# Patient Record
Sex: FEMALE | Race: BLACK OR AFRICAN AMERICAN | NOT HISPANIC OR LATINO | Employment: FULL TIME | URBAN - METROPOLITAN AREA
[De-identification: names, ages, dates, MRNs, and addresses within clinical notes are randomized per-mention and may not be internally consistent; named-entity substitution may affect disease eponyms.]

---

## 2018-05-01 PROCEDURE — G0124 SCREEN C/V THIN LAYER BY MD: HCPCS | Performed by: PATHOLOGY

## 2018-05-01 PROCEDURE — G0145 SCR C/V CYTO,THINLAYER,RESCR: HCPCS | Performed by: PATHOLOGY

## 2018-05-01 PROCEDURE — 87624 HPV HI-RISK TYP POOLED RSLT: CPT | Performed by: FAMILY MEDICINE

## 2018-05-02 ENCOUNTER — LAB REQUISITION (OUTPATIENT)
Dept: LAB | Facility: HOSPITAL | Age: 48
End: 2018-05-02
Payer: COMMERCIAL

## 2018-05-02 DIAGNOSIS — Z01.419 ENCOUNTER FOR GYNECOLOGICAL EXAMINATION WITHOUT ABNORMAL FINDING: ICD-10-CM

## 2018-05-02 DIAGNOSIS — Z11.51 ENCOUNTER FOR SCREENING FOR HUMAN PAPILLOMAVIRUS (HPV): ICD-10-CM

## 2018-05-03 LAB — HPV RRNA GENITAL QL NAA+PROBE: NORMAL

## 2018-05-08 LAB
LAB AP GYN PRIMARY INTERPRETATION: NORMAL
LAB AP LMP: NORMAL
Lab: NORMAL
PATH INTERP SPEC-IMP: NORMAL

## 2018-06-04 LAB
ABSOL LYMPHOCYTES (HISTORICAL): 2.4 K/UL (ref 0.5–4)
ALBUMIN SERPL BCP-MCNC: 3.6 G/DL (ref 3–5.2)
ALP SERPL-CCNC: 68 U/L (ref 43–122)
ALT SERPL W P-5'-P-CCNC: 42 U/L (ref 9–52)
ANION GAP SERPL CALCULATED.3IONS-SCNC: 8 MMOL/L (ref 5–14)
AST SERPL W P-5'-P-CCNC: 52 U/L (ref 14–36)
BASOPHILS # BLD AUTO: 0.1 K/UL (ref 0–0.1)
BASOPHILS # BLD AUTO: 1 % (ref 0–1)
BILIRUB SERPL-MCNC: 0.7 MG/DL
BUN SERPL-MCNC: 8 MG/DL (ref 5–25)
CALCIUM SERPL-MCNC: 9.2 MG/DL (ref 8.4–10.2)
CHLORIDE SERPL-SCNC: 102 MEQ/L (ref 97–108)
CHOLEST SERPL-MCNC: 166 MG/DL
CHOLEST/HDLC SERPL: 3.6 {RATIO}
CO2 SERPL-SCNC: 29 MMOL/L (ref 22–30)
CREATINE, SERUM (HISTORICAL): 0.71 MG/DL (ref 0.6–1.2)
DEPRECATED RDW RBC AUTO: 15.8 %
EGFR (HISTORICAL): >60 ML/MIN/1.73 M2
EOSINOPHIL # BLD AUTO: 0.1 K/UL (ref 0–0.4)
EOSINOPHIL NFR BLD AUTO: 1 % (ref 0–6)
EST. AVERAGE GLUCOSE BLD GHB EST-MCNC: 111 MG/DL
GLUCOSE SERPL-MCNC: 98 MG/DL (ref 70–99)
HBA1C MFR BLD HPLC: 5.5 %
HCT VFR BLD AUTO: 37.2 % (ref 36–46)
HDLC SERPL-MCNC: 46 MG/DL
HGB BLD-MCNC: 12.5 G/DL (ref 12–16)
LDL/HDL RATIO (HISTORICAL): 1.7
LDLC SERPL CALC-MCNC: 78 MG/DL
LYMPHOCYTES NFR BLD AUTO: 27 % (ref 25–45)
MCH RBC QN AUTO: 33.4 PG (ref 26–34)
MCHC RBC AUTO-ENTMCNC: 33.7 % (ref 31–36)
MCV RBC AUTO: 99 FL (ref 80–100)
MONOCYTES # BLD AUTO: 0.7 K/UL (ref 0.2–0.9)
MONOCYTES NFR BLD AUTO: 8 % (ref 1–10)
NEUTROPHILS ABS COUNT (HISTORICAL): 5.6 K/UL (ref 1.8–7.8)
NEUTS SEG NFR BLD AUTO: 63 % (ref 45–65)
PLATELET # BLD AUTO: 266 K/MCL (ref 150–450)
POTASSIUM SERPL-SCNC: 3.8 MEQ/L (ref 3.6–5)
RBC # BLD AUTO: 3.75 M/MCL (ref 4–5.2)
SODIUM SERPL-SCNC: 139 MEQ/L (ref 137–147)
T4 FREE SERPL-MCNC: 0.99 NG/DL (ref 0.78–2.19)
TOTAL PROTEIN (HISTORICAL): 6.9 G/DL (ref 5.9–8.4)
TRIGL SERPL-MCNC: 208 MG/DL
TSH SERPL DL<=0.05 MIU/L-ACNC: 0.45 UIU/ML (ref 0.47–4.68)
VLDLC SERPL CALC-MCNC: 42 MG/DL (ref 0–40)
WBC # BLD AUTO: 8.8 K/MCL (ref 4.5–11)

## 2018-11-28 ENCOUNTER — HOSPITAL ENCOUNTER (EMERGENCY)
Facility: HOSPITAL | Age: 48
Discharge: HOME | End: 2018-11-28
Attending: EMERGENCY MEDICINE | Admitting: EMERGENCY MEDICINE
Payer: COMMERCIAL

## 2018-11-28 VITALS
HEART RATE: 97 BPM | RESPIRATION RATE: 16 BRPM | TEMPERATURE: 96.9 F | DIASTOLIC BLOOD PRESSURE: 89 MMHG | OXYGEN SATURATION: 99 % | SYSTOLIC BLOOD PRESSURE: 140 MMHG | WEIGHT: 215.39 LBS

## 2018-11-28 DIAGNOSIS — F10.20 ALCOHOL DEPENDENCE (HCC): Primary | ICD-10-CM

## 2018-11-28 PROCEDURE — 99282 EMERGENCY DEPT VISIT SF MDM: CPT

## 2018-11-28 NOTE — ED PROVIDER NOTES
History  Chief Complaint   Patient presents with    Addiction Problem     pt states that she has been drinking daily for about 9 months  pt denies SI/HI/AH/VH  pt stating that she wants rehab     44-year-old female presents for referral for alcohol detox  She states that she drinks a minimum of a pt of hard liquor daily and has been for several months  She states that whenever she goes for prolonged periods of time without drinking she will get the shakes and feel dizzy but denies ever having withdrawal seizures  She states that approximately 20 years ago she had gone through detox and feels that this is what she needs again  She admits to anxiety depression related to her drinking but denies any thoughts of harming herself  She denies homicidal ideation but states that at times she feels like she is hallucinating  She reports that her last drink was around 11 o'clock this morning  Addiction Problem   Similar prior episodes: yes    Severity:  Severe  Onset quality:  Gradual  Duration: Months  Timing:  Constant  Progression:  Waxing and waning  Chronicity:  Recurrent  Suspected agents:  Alcohol  Associated symptoms: no abdominal pain, no agitation, no blackouts, no bladder incontinence, no bowel incontinence, no confusion, no hallucinations, no headaches, no loss of consciousness, no nausea, no palpitations, no seizures, no shortness of breath, no somnolence, no suicidal ideation, no violence, no vomiting and no weakness        None       Past Medical History:   Diagnosis Date    Cancer ([facility] [address] )     cervical cancer    Hypertension        Past Surgical History:   Procedure Laterality Date    CERVICAL BIOPSY  W/ LOOP ELECTRODE EXCISION      TUBAL LIGATION         History reviewed  No pertinent family history  I have reviewed and agree with the history as documented      Social History   Substance Use Topics    Smoking status: Current Some Day Smoker    Smokeless tobacco: Never Used    Alcohol use Yes      Comment: daily        Review of Systems   Constitutional: Negative for appetite change, chills, fatigue and fever  HENT: Negative for postnasal drip, sinus pain and trouble swallowing  Eyes: Negative for redness and itching  Respiratory: Negative for chest tightness, shortness of breath and wheezing  Cardiovascular: Negative for chest pain, palpitations and leg swelling  Gastrointestinal: Negative for abdominal pain, bowel incontinence, constipation, diarrhea, nausea and vomiting  Endocrine: Negative  Genitourinary: Negative for bladder incontinence, difficulty urinating and dysuria  Musculoskeletal: Negative for back pain and myalgias  Skin: Negative for rash  Allergic/Immunologic: Negative  Neurological: Positive for dizziness (Not currently)  Negative for seizures, loss of consciousness, weakness, numbness and headaches  Hematological: Negative  Psychiatric/Behavioral: Negative  Negative for agitation, confusion, hallucinations and suicidal ideas  Physical Exam  Physical Exam   Constitutional: She is oriented to person, place, and time  She appears well-developed and well-nourished  HENT:   Head: Normocephalic and atraumatic  Nose: Nose normal    Mouth/Throat: Oropharynx is clear and moist    Eyes: Pupils are equal, round, and reactive to light  Conjunctivae and EOM are normal    Neck: Normal range of motion  Neck supple  Cardiovascular: Normal rate, regular rhythm and normal heart sounds  Pulmonary/Chest: Effort normal and breath sounds normal  No respiratory distress  She has no wheezes  Abdominal: Soft  Bowel sounds are normal  There is no tenderness  There is no guarding  Musculoskeletal: She exhibits no edema, tenderness or deformity  Neurological: She is alert and oriented to person, place, and time  Skin: Skin is warm and dry  Capillary refill takes less than 2 seconds  No rash noted  Psychiatric: She has a normal mood and affect   Her behavior is normal    Nursing note and vitals reviewed  Vital Signs  ED Triage Vitals [11/28/18 1752]   Temperature Pulse Respirations Blood Pressure SpO2   (!) 96 9 °F (36 1 °C) (!) 107 14 126/87 98 %      Temp Source Heart Rate Source Patient Position - Orthostatic VS BP Location FiO2 (%)   Tympanic Monitor Sitting Left arm --      Pain Score       --           Vitals:    11/28/18 1752   BP: 126/87   Pulse: (!) 107   Patient Position - Orthostatic VS: Sitting       Visual Acuity      ED Medications  Medications - No data to display    Diagnostic Studies  Results Reviewed     None                 No orders to display              Procedures  Procedures       Phone Contacts  ED Phone Contact    ED Course  ED Course as of Nov 28 1954   Wed Nov 28, 2018   1941 Prior to evaluation for alcohol detox, the patient eloped from the department  At the time of her elopement, she was not obviously under the influence his of alcohol  She was not suicidal or homicidal and there were no reasons to hold her against her wishes  After patient had eloped, she was escorted back into the department by her relative  MDM  Number of Diagnoses or Management Options  Alcohol dependence [facility]):   Diagnosis management comments: 79-year-old female presents for referral for alcohol detox  She reports drinking at least a pt of hard liquor daily for the past several months  Host came in to evaluate the patient and arrangements were made for her to follow-up  She is not suicidal or homicidal but is somewhat depressed and anxious about her alcohol use  Patient is aware of reasons to return to the ER as well as the importance of maintaining follow-up for her alcohol detox      CritCare Time    Disposition  Final diagnoses:   Alcohol dependence ([address] )     Time reflects when diagnosis was documented in both MDM as applicable and the Disposition within this note     Time User Action Codes Description Comment 11/28/2018  7:12 PM Leilani Gupta Add [F10 20] Alcohol dependence [facility])       ED Disposition     ED Disposition Condition Comment    Discharge  Sandeep Gonzalez discharge to home/self care  Condition at discharge: Good        Follow-up Information     Follow up With Specialties Details Why Contact Info       Follow-up as discussed  Patient's Medications    No medications on file     No discharge procedures on file      ED Provider  Electronically Signed by           Bk Tello DO  11/28/18 1954

## 2018-11-28 NOTE — ED NOTES
John Herrera from [facility] is present to evaluate the patient for detox/rehab placement

## 2018-11-28 NOTE — Clinical Note
Prior to evaluation for alcohol detox, the patient eloped from the department  At the time of her elopement, she was not obviously under the influence his of alcohol  She was not suicidal or homicidal and there were no reasons to hold her against her w dejuan

## 2018-11-29 NOTE — DISCHARGE INSTRUCTIONS
Abuse of Alcohol   WHAT YOU NEED TO KNOW:   · Alcohol abuse is unhealthy drinking behavior  You may drink too much at one time once a week, or continue to drink too much daily  You continue to drink even though it causes problems  The problems can be alcohol related legal problems, or problems with work or relationships with family  · If you drink too much at one time, you are binge drinking  Binge drinking is when you have a large amount of alcohol in a short time  Your blood alcohol concentrations (SHERIE) goes above 0 08 g/dLlevel during binge drinking  For men, this usually happens with more than 4 drinks in 2 hours  For women, it is more than 3 drinks in 2 hours  A drink is 12 ounces of beer, 4 ounces of wine, or 1½ ounces of liquor  DISCHARGE INSTRUCTIONS:   Call 911 for any of the following:   · You have sudden chest pain or trouble breathing  · You have a seizure or have shaking or trembling  · You were in an accident because of alcohol  Return to the emergency department if:   · You want to harm yourself or others  · You have hallucinations (you see or hear things that are not real)  · You cannot stop vomiting or you vomit blood  Contact your healthcare provider if:   · You need help to stop drinking alcohol  · You have questions or concerns about your condition or care  Medicines:   · Vitamin supplements  may be given to treat low vitamin levels  Alcohol can make it hard for your body to absorb enough vitamins such as B1  Vitamin supplements may also be given to prevent alcohol related brain damage  · Take your medicine as directed  Contact your healthcare provider if you think your medicine is not helping or if you have side effects  Tell him or her if you are allergic to any medicine  Keep a list of the medicines, vitamins, and herbs you take  Include the amounts, and when and why you take them  Bring the list or the pill bottles to follow-up visits   Carry your medicine list with you in case of an emergency  Treatments or therapies you may need:   · Detoxification (detox) and withdrawal  is a program that helps you to safely get alcohol out of your body  Detox can also help get rid of the physical need to drink  Healthcare providers monitor the physical symptoms of withdrawal  They may give you medicines to help decrease nausea, dehydration, and seizures  Healthcare providers will also monitor your blood pressure, heart and breathing rates, and your temperature  Symptoms of anxiety, depression, and suicidal thoughts are also monitored and managed during detox  Healthcare providers may give you medicines for these symptoms and therapy sessions will be available to you  Detox is usually done at a detox center or in a hospital  Healthcare providers do not recommend that you try to detox at home or by yourself  Withdrawal symptoms may become life-threatening  The center can help you find 12 step programs or an individual therapist to help with emotional support after detox  · Inpatient and outpatient treatment  focus on your personal needs to help you stop drinking  Treatment helps you understand the reasons you abuse alcohol  Counselors and therapists provide you with support and help you find ways to cope instead of drinking  You may need inpatient treatment to provide a controlled environment  You may need outpatient treatment after your inpatient treatment is complete  · Alcohol aversion therapy  takes away the desire to drink by causing a negative reaction when you drink  Healthcare providers may give you medicines that cause nausea and vomiting when you drink alcohol  They may instead give you a medicine that decreases your urge to drink alcohol  These medicines are used to help you stop drinking or reduce the amount you drink  They can also help you avoid relapse    Follow up with your healthcare provider as directed:  Write down your questions so you remember to ask them during your visits  Avoid alcohol:  You should stop drinking entirely  Alcohol can damage your brain, heart, and liver  It also increases your risk for injury, high blood pressure, and certain types of cancer  Alcohol is dangerous when you combine it with certain medicines  Do not drive if you have had alcohol:  Make sure someone who has not been drinking can help you get home  Get support:  Most people need support to stop drinking alcohol  Mental health providers, support groups, rehabilitation centers, and your healthcare provider can provide support  For more information:   · Alcoholics Anonymous  Web Address: http://[employer]/  · Substance Abuse and [address] [place]  Web Address: https://[employer]/  © 2017 [address] [employer] Information is for End User's use only and may not be sold, redistributed or otherwise used for commercial purposes  All illustrations and images included in CareNotes® are the copyrighted property of A D A M , Inc  or Orville Trinh  The above information is an  only  It is not intended as medical advice for individual conditions or treatments  Talk to your doctor, nurse or pharmacist before following any medical regimen to see if it is safe and effective for you  Alcohol Dependence   WHAT YOU NEED TO KNOW:   Alcohol dependence is the need to drink alcohol often to function in your daily life  You often drink large amounts of alcohol  Alcohol dependence is also known as alcoholism or alcohol use disorder  Alcoholism is a disease that can affect almost every part of your body  DISCHARGE INSTRUCTIONS:   Follow up with your healthcare provider as directed:  Do not try to stop drinking on your own  Your healthcare provider will help you withdraw from alcohol safely   He may need to admit you to the hospital  You may also need any of the following treatments:  · Medicines to decrease your craving for alcohol    · Support groups such as Alcoholics Anonymous     · Psychiatrist or psychologist for therapy     · Admission to an inpatient facility for treatment for severe dependence  Return to the emergency department if:   · Your heart is beating faster than normal     · You have hallucinations  · You cannot remember what happens while you are drinking  · You have seizures  Contact your healthcare provider if:   · You are anxious and have nausea  · Your hands are shaky and you are sweating heavily  · You have questions or concerns about your condition or care  Common behaviors of alcohol dependence:   · You keep drinking alcohol even if you know it increases your risk for health problems  Health problems include liver problems, stomach ulcers, high blood pressure, and stroke  · You develop tolerance for alcohol  Tolerance means the amount of alcohol you usually drink no longer causes the effects you may desire  You may need to drink even more alcohol to get its previous effects  · You put extra effort and time into drinking alcohol  You may often go to events or activities that will include drinking  You may also spend much of your time drinking alcohol or being with people who also drink  · You have withdrawal (physical or mental) symptoms after not drinking for a short period  The same amount of alcohol may be needed to relieve or prevent withdrawal symptoms  You may also have to drink to stop tremors (shakes) or to cure a hangover  · You crave alcohol  You may have a desire to drink more frequently and to drink larger amounts of alcohol  · You have problems decreasing or controlling alcohol use  You are not able to control your drinking habits  You keep going back to drinking even after you quit  · You spend less time doing more important things  You have trouble with social or daily activities at school, work, or home    For more information:   ·   [facility] on Alcoholism and Drug Dependence  [place], [place] [address]  Phone: 7- 160 - 036-7803  Phone: 5- 569 - 597-5749  Web Address: [employer] br  org      ·   ¨ Alcoholics Anonymous  Web Address: http://[employer]/  © 2017 [address] [employer] Information is for End User's use only and may not be sold, redistributed or otherwise used for commercial purposes  All illustrations and images included in CareNotes® are the copyrighted property of A D A M , Inc  or Orville Tsang  The above information is an  only  It is not intended as medical advice for individual conditions or treatments  Talk to your doctor, nurse or pharmacist before following any medical regimen to see if it is safe and effective for you

## 2018-11-29 NOTE — ED NOTES
Patient walked out states that she wants to leave, patient agreed to come back to room since she forgot her phone and this nurse told patient that HOST is present in the department and would be able to see her        Milka Hernández RN  11/28/18 1921

## 2019-01-07 ENCOUNTER — HOSPITAL ENCOUNTER (EMERGENCY)
Facility: HOSPITAL | Age: 49
Discharge: HOME | End: 2019-01-07
Attending: EMERGENCY MEDICINE

## 2019-01-07 VITALS
SYSTOLIC BLOOD PRESSURE: 168 MMHG | TEMPERATURE: 97.3 F | HEART RATE: 107 BPM | DIASTOLIC BLOOD PRESSURE: 89 MMHG | OXYGEN SATURATION: 98 % | WEIGHT: 211.64 LBS | RESPIRATION RATE: 18 BRPM

## 2019-01-07 DIAGNOSIS — F10.10 ALCOHOL ABUSE: Primary | ICD-10-CM

## 2019-01-07 DIAGNOSIS — K29.70 GASTRITIS: ICD-10-CM

## 2019-01-07 DIAGNOSIS — E86.0 DEHYDRATION: ICD-10-CM

## 2019-01-07 DIAGNOSIS — R11.10 VOMITING: ICD-10-CM

## 2019-01-07 DIAGNOSIS — E83.42 HYPOMAGNESEMIA: ICD-10-CM

## 2019-01-07 DIAGNOSIS — E87.6 HYPOKALEMIA: ICD-10-CM

## 2019-01-07 LAB
ALBUMIN SERPL BCP-MCNC: 3.7 G/DL (ref 3–5.2)
ALP SERPL-CCNC: 185 U/L (ref 43–122)
ALT SERPL W P-5'-P-CCNC: 130 U/L (ref 9–52)
AMPHETAMINES SERPL QL SCN: NEGATIVE
ANION GAP SERPL CALCULATED.3IONS-SCNC: 14 MMOL/L (ref 5–14)
ANISOCYTOSIS BLD QL SMEAR: PRESENT
AST SERPL W P-5'-P-CCNC: 285 U/L (ref 14–36)
BACTERIA UR QL AUTO: ABNORMAL /HPF
BARBITURATES UR QL: NEGATIVE
BASOPHILS # BLD AUTO: 0.1 THOUSANDS/ΜL (ref 0–0.1)
BASOPHILS NFR BLD AUTO: 1 % (ref 0–1)
BENZODIAZ UR QL: NEGATIVE
BILIRUB SERPL-MCNC: 1.3 MG/DL
BILIRUB UR QL STRIP: NEGATIVE
BUN SERPL-MCNC: 3 MG/DL (ref 5–25)
CALCIUM SERPL-MCNC: 7.6 MG/DL (ref 8.4–10.2)
CHLORIDE SERPL-SCNC: 103 MMOL/L (ref 97–108)
CLARITY UR: CLEAR
CO2 SERPL-SCNC: 24 MMOL/L (ref 22–30)
COCAINE UR QL: NEGATIVE
COLOR UR: ABNORMAL
CREAT SERPL-MCNC: 0.74 MG/DL (ref 0.6–1.2)
EOSINOPHIL # BLD AUTO: 0 THOUSAND/ΜL (ref 0–0.4)
EOSINOPHIL NFR BLD AUTO: 0 % (ref 0–6)
ERYTHROCYTE [DISTWIDTH] IN BLOOD BY AUTOMATED COUNT: 20.1 %
ETHANOL SERPL-MCNC: 94 MG/DL (ref 0–10)
EXT PREG TEST URINE: NORMAL
GFR SERPL CREATININE-BSD FRML MDRD: 112 ML/MIN/1.73SQ M
GLUCOSE SERPL-MCNC: 111 MG/DL (ref 70–99)
GLUCOSE UR STRIP-MCNC: NEGATIVE MG/DL
HCT VFR BLD AUTO: 39.4 % (ref 36–46)
HGB BLD-MCNC: 13.1 G/DL (ref 12–16)
HGB UR QL STRIP.AUTO: NEGATIVE
KETONES UR STRIP-MCNC: ABNORMAL MG/DL
LEUKOCYTE ESTERASE UR QL STRIP: 25
LYMPHOCYTES # BLD AUTO: 1.1 THOUSANDS/ΜL (ref 0.5–4)
LYMPHOCYTES NFR BLD AUTO: 17 % (ref 25–45)
MAGNESIUM SERPL-MCNC: 1.2 MG/DL (ref 1.6–2.3)
MCH RBC QN AUTO: 36.1 PG (ref 26–34)
MCHC RBC AUTO-ENTMCNC: 33.3 G/DL (ref 31–36)
MCV RBC AUTO: 109 FL (ref 80–100)
METHADONE UR QL: NEGATIVE
MONOCYTES # BLD AUTO: 0.7 THOUSAND/ΜL (ref 0.2–0.9)
MONOCYTES NFR BLD AUTO: 12 % (ref 1–10)
MUCOUS THREADS UR QL AUTO: ABNORMAL
NEUTROPHILS # BLD AUTO: 4.3 THOUSANDS/ΜL (ref 1.8–7.8)
NEUTS SEG NFR BLD AUTO: 69 % (ref 45–65)
NITRITE UR QL STRIP: NEGATIVE
NON-SQ EPI CELLS URNS QL MICRO: ABNORMAL /HPF
OPIATES UR QL SCN: NEGATIVE
PCP UR QL: NEGATIVE
PH UR STRIP.AUTO: 6 [PH] (ref 4.5–8)
PLATELET # BLD AUTO: 269 THOUSANDS/UL (ref 150–450)
PLATELET BLD QL SMEAR: ADEQUATE
PMV BLD AUTO: 8.1 FL (ref 8.9–12.7)
POTASSIUM SERPL-SCNC: 3.2 MMOL/L (ref 3.6–5)
PROT SERPL-MCNC: 7 G/DL (ref 5.9–8.4)
PROT UR STRIP-MCNC: ABNORMAL MG/DL
RBC # BLD AUTO: 3.63 MILLION/UL (ref 4–5.2)
RBC #/AREA URNS AUTO: ABNORMAL /HPF
RBC MORPH BLD: NORMAL
SODIUM SERPL-SCNC: 141 MMOL/L (ref 137–147)
SP GR UR STRIP.AUTO: 1.01 (ref 1–1.04)
THC UR QL: NEGATIVE
UROBILINOGEN UA: 1 MG/DL
WBC # BLD AUTO: 6.2 THOUSAND/UL (ref 4.5–11)
WBC #/AREA URNS AUTO: ABNORMAL /HPF

## 2019-01-07 PROCEDURE — 81001 URINALYSIS AUTO W/SCOPE: CPT | Performed by: EMERGENCY MEDICINE

## 2019-01-07 PROCEDURE — 83735 ASSAY OF MAGNESIUM: CPT | Performed by: EMERGENCY MEDICINE

## 2019-01-07 PROCEDURE — 80320 DRUG SCREEN QUANTALCOHOLS: CPT | Performed by: EMERGENCY MEDICINE

## 2019-01-07 PROCEDURE — 36415 COLL VENOUS BLD VENIPUNCTURE: CPT | Performed by: EMERGENCY MEDICINE

## 2019-01-07 PROCEDURE — 80307 DRUG TEST PRSMV CHEM ANLYZR: CPT | Performed by: EMERGENCY MEDICINE

## 2019-01-07 PROCEDURE — 96375 TX/PRO/DX INJ NEW DRUG ADDON: CPT

## 2019-01-07 PROCEDURE — C9113 INJ PANTOPRAZOLE SODIUM, VIA: HCPCS | Performed by: EMERGENCY MEDICINE

## 2019-01-07 PROCEDURE — 99285 EMERGENCY DEPT VISIT HI MDM: CPT

## 2019-01-07 PROCEDURE — 81025 URINE PREGNANCY TEST: CPT | Performed by: EMERGENCY MEDICINE

## 2019-01-07 PROCEDURE — 96361 HYDRATE IV INFUSION ADD-ON: CPT

## 2019-01-07 PROCEDURE — 96365 THER/PROPH/DIAG IV INF INIT: CPT

## 2019-01-07 PROCEDURE — 85025 COMPLETE CBC W/AUTO DIFF WBC: CPT | Performed by: EMERGENCY MEDICINE

## 2019-01-07 PROCEDURE — 80053 COMPREHEN METABOLIC PANEL: CPT | Performed by: EMERGENCY MEDICINE

## 2019-01-07 RX ORDER — ONDANSETRON 2 MG/ML
4 INJECTION INTRAMUSCULAR; INTRAVENOUS ONCE
Status: COMPLETED | OUTPATIENT
Start: 2019-01-07 | End: 2019-01-07

## 2019-01-07 RX ORDER — LORAZEPAM 2 MG/ML
1 INJECTION INTRAMUSCULAR ONCE
Status: COMPLETED | OUTPATIENT
Start: 2019-01-07 | End: 2019-01-07

## 2019-01-07 RX ORDER — PANTOPRAZOLE SODIUM 40 MG/1
40 INJECTION, POWDER, FOR SOLUTION INTRAVENOUS ONCE
Status: COMPLETED | OUTPATIENT
Start: 2019-01-07 | End: 2019-01-07

## 2019-01-07 RX ORDER — POTASSIUM CHLORIDE 750 MG/1
40 TABLET, EXTENDED RELEASE ORAL ONCE
Status: COMPLETED | OUTPATIENT
Start: 2019-01-07 | End: 2019-01-07

## 2019-01-07 RX ORDER — AMLODIPINE BESYLATE 2.5 MG/1
5 TABLET ORAL DAILY
COMMUNITY
End: 2019-02-14

## 2019-01-07 RX ORDER — MAGNESIUM SULFATE HEPTAHYDRATE 40 MG/ML
2 INJECTION, SOLUTION INTRAVENOUS ONCE
Status: COMPLETED | OUTPATIENT
Start: 2019-01-07 | End: 2019-01-07

## 2019-01-07 RX ORDER — LORAZEPAM 1 MG/1
1 TABLET ORAL 4 TIMES DAILY
Qty: 10 TABLET | Refills: 0 | Status: SHIPPED | OUTPATIENT
Start: 2019-01-07 | End: 2019-02-14

## 2019-01-07 RX ORDER — ONDANSETRON 4 MG/1
4 TABLET, ORALLY DISINTEGRATING ORAL EVERY 6 HOURS PRN
Qty: 20 TABLET | Refills: 0 | Status: SHIPPED | OUTPATIENT
Start: 2019-01-07 | End: 2019-02-14

## 2019-01-07 RX ORDER — PANTOPRAZOLE SODIUM 20 MG/1
20 TABLET, DELAYED RELEASE ORAL DAILY
Qty: 20 TABLET | Refills: 0 | Status: SHIPPED | OUTPATIENT
Start: 2019-01-07 | End: 2019-02-14

## 2019-01-07 RX ADMIN — ONDANSETRON HYDROCHLORIDE 4 MG: 2 INJECTION, SOLUTION INTRAMUSCULAR; INTRAVENOUS at 07:52

## 2019-01-07 RX ADMIN — PANTOPRAZOLE SODIUM 40 MG: 40 INJECTION, POWDER, FOR SOLUTION INTRAVENOUS at 08:17

## 2019-01-07 RX ADMIN — MAGNESIUM SULFATE IN WATER 2 G: 40 INJECTION, SOLUTION INTRAVENOUS at 09:21

## 2019-01-07 RX ADMIN — SODIUM CHLORIDE 1000 ML: 0.9 INJECTION, SOLUTION INTRAVENOUS at 07:52

## 2019-01-07 RX ADMIN — SODIUM CHLORIDE 1000 ML: 9 INJECTION, SOLUTION INTRAVENOUS at 09:15

## 2019-01-07 RX ADMIN — POTASSIUM CHLORIDE 40 MEQ: 10 TABLET, EXTENDED RELEASE ORAL at 09:21

## 2019-01-07 RX ADMIN — LORAZEPAM 1 MG: 2 INJECTION INTRAMUSCULAR; INTRAVENOUS at 08:17

## 2019-01-07 NOTE — ED NOTES
Patient states drinking has been out of control and wants help to stop  Denies SI, HI, AH, VH       Nissa Zaragoza, WIL  01/07/19 5369

## 2019-01-07 NOTE — ED NOTES
Patient was evaluated by HOST, and they are making arrangements for outpt  [place]  Pt is ok for discharge

## 2019-01-07 NOTE — DISCHARGE INSTRUCTIONS
Abuse of Alcohol   AMBULATORY CARE:   Alcohol abuse   · is unhealthy drinking behavior  You may drink too much at one time once a week, or continue to drink too much daily  You continue to drink even though it causes problems  The problems can be alcohol related legal problems or problems with work or family  · If you drink too much at one time, you are binge drinking  Binge drinking is when you have a large amount of alcohol in a short time  Your blood alcohol concentrations (SHERIE) goes above 0 08 g/dLlevel during binge drinking  For men, this usually happens with more than 4 drinks in 2 hours  For women, it is more than 3 drinks in 2 hours  A drink is 12 ounces of beer, 4 ounces of wine, or 1½ ounces of liquor  Common signs and symptoms of alcohol abuse include:  Each person that abuses alcohol may have different symptoms  The following are common signs and symptoms of alcohol abuse:  · Loss of interest in activities, work, and school    · Decreased interest in family and friends    · Depression    · Constant thoughts about drinking    · Not able to control the amount you drink    · Restlessness, or erratic and violent behavior  Call 911 for any of the following:   · You have sudden chest pain or trouble breathing  · You have a seizure or have shaking or trembling  · You feel like you could harm yourself or others  · You were in an accident because of alcohol  Seek care immediately if:   · You have hallucinations (you see or hear things that are not real)  · You cannot stop vomiting or you vomit blood  Contact your healthcare provider if:   · You need help to stop drinking alcohol  · You have questions or concerns about your condition or care    Long-term effects of alcohol abuse:   · Blackouts    · Memory loss    · Dementia    · Liver disease    · Thiamine (vitamin B1) deficiency  Treatment for alcohol abuse  may include the following:  · Detoxification (detox) and withdrawal  is a program that helps you to safely get alcohol out of your body  Detox can also help get rid of the physical need to drink  Healthcare providers monitor the physical symptoms of withdrawal  They may give you medicines to help decrease nausea, dehydration, and seizures  Healthcare providers will also monitor your blood pressure, heart and breathing rates, and your temperature  Symptoms of anxiety, depression, and suicidal thoughts are also monitored and managed during detox  Healthcare providers may give you medicines for these symptoms and therapy sessions will be available to you  Detox is usually done at a detox center or in a hospital  Healthcare providers do not recommend that you try to detox at home or by yourself  Withdrawal symptoms may become life threatening  The center can help you find 12 step programs or an individual therapist to help with emotional support after detox  · Inpatient and outpatient treatment  focus on your personal needs to help you stop drinking  Treatment helps you understand the reasons you abuse alcohol  Counselors and therapists provide you with support and help you find ways to cope instead of drinking  You may need inpatient treatment to provide a controlled environment  You may need outpatient treatment after your inpatient treatment is complete  · Alcohol aversion therapy  takes away the desire to drink by causing a negative reaction when you drink  Healthcare providers may give you medicines that cause nausea and vomiting when you drink alcohol  They may instead give you a medicine that decreases your urge to drink alcohol  These medicines are used to help you stop drinking or reduce the amount you drink  They can also help you avoid relapse  Risks of alcohol abuse:  Alcohol abuse increases your risk for gastrointestinal cancers, brain damage and problems with your immune system  It also increases your risk for heart, kidney, and lung damage   The risk of stroke increases with alcohol abuse  If you are pregnant and drink alcohol, you and your baby are at risk for serious health problems  Avoid alcohol:  You should stop drinking entirely  Alcohol can damage your brain, heart, and liver  It also increases your risk for injury, high blood pressure, and certain types of cancer  Alcohol is dangerous when you combine it with certain medicines  Do not drive if you drink alcohol:  Make sure someone who has not been drinking can help you get home  Get support:  Most people need support to stop drinking alcohol  Mental health providers, support groups, rehabilitation centers, and your healthcare provider can provide support  For more information:   · Alcoholics Anonymous  Web Address: http://www Relay/  · Substance Abuse and [address] [place]  Web Address: https://[employer]/  Follow up with your healthcare provider as directed:  Write down your questions so you remember to ask them during your visits  © 2017 [address] Information is for End User's use only and may not be sold, redistributed or otherwise used for commercial purposes  All illustrations and images included in CareNotes® are the copyrighted property of [employer]  or Orville Tsang  The above information is an  only  It is not intended as medical advice for individual conditions or treatments  Talk to your doctor, nurse or pharmacist before following any medical regimen to see if it is safe and effective for you  Dehydration   WHAT YOU NEED TO KNOW:   Dehydration is a condition that develops when your body does not have enough fluid  You may become dehydrated if you do not drink enough water or lose too much fluid  Fluid loss may also cause loss of electrolytes (minerals), such as sodium  DISCHARGE INSTRUCTIONS:   Seek care immediately if:   · You have a seizure      · You are confused or cannot think clearly  · You are extremely sleepy, or another person cannot wake you  · You become dizzy or faint when you stand  · You are not able to urinate  · You have trouble breathing  · You have a fast or irregular heartbeat  · Your hands or feet are cold, or your face is pale  Contact your healthcare provider if:   · You have trouble drinking liquids because you are vomiting  · Your symptoms get worse  · You have a fever  · You feel very weak or tired  · You have questions or concerns about your condition or care  Follow up with your healthcare provider as directed:  Write down your questions so you remember to ask them during your visits  Prevent or manage dehydration:   · Drink liquids as directed  Liquids that contain water, sugar, and minerals can help your body hold in fluid and help prevent dehydration  Drink liquids throughout the day, not just when you feel thirsty  Men should drink about 3 liters (13 eight-ounce cups) of liquid each day  Women should drink about 2 liters (9 eight-ounce cups) of liquid each day  Drink even more liquid if you will be outdoors, in the sun for a long time, or exercising  · Stay cool  Limit the time you spend outdoors during the hottest part of the day  Dress in lightweight clothes  · Keep track of how often you urinate  If you urinate less than usual or your urine is darker, drink more liquids  © 2017 [address] Information is for End User's use only and may not be sold, redistributed or otherwise used for commercial purposes  All illustrations and images included in CareNotes® are the copyrighted property of A D A M , Inc  or Orville Tsang  The above information is an  only  It is not intended as medical advice for individual conditions or treatments  Talk to your doctor, nurse or pharmacist before following any medical regimen to see if it is safe and effective for you      Gastritis   WHAT YOU NEED TO KNOW:   Gastritis is inflammation or irritation of the lining of your stomach  DISCHARGE INSTRUCTIONS:   Call 911 for any of the following:   · You develop chest pain or shortness of breath  Return to the emergency department if:   · You vomit blood  · You have black or bloody bowel movements  · You have severe stomach or back pain  Contact your healthcare provider if:   · You have a fever  · You have new or worsening symptoms, even after treatment  · You have questions or concerns about your condition or care  Medicines:   · Medicines  may be given to help treat a bacterial infection or decrease stomach acid  · Take your medicine as directed  Contact your healthcare provider if you think your medicine is not helping or if you have side effects  Tell him or her if you are allergic to any medicine  Keep a list of the medicines, vitamins, and herbs you take  Include the amounts, and when and why you take them  Bring the list or the pill bottles to follow-up visits  Carry your medicine list with you in case of an emergency  Manage or prevent gastritis:   · Do not smoke  Nicotine and other chemicals in cigarettes and cigars can make your symptoms worse and cause lung damage  Ask your healthcare provider for information if you currently smoke and need help to quit  E-cigarettes or smokeless tobacco still contain nicotine  Talk to your healthcare provider before you use these products  · Do not drink alcohol  Alcohol can prevent healing and make your gastritis worse  Talk to your healthcare provider if you need help to stop drinking  · Do not take NSAIDs or aspirin unless directed  These and similar medicines can cause irritation  If your healthcare provider says it is okay to take NSAIDs, take them with food  · Do not eat foods that cause irritation  Foods such as oranges and salsa can cause burning or pain  Eat a variety of healthy foods   Examples include fruits (not citrus), vegetables, low-fat dairy products, beans, whole-grain breads, and lean meats and fish  Try to eat small meals, and drink water with your meals  Do not eat for at least 3 hours before you go to bed  · Find ways to relax and decrease stress  Stress can increase stomach acid and make gastritis worse  Activities such as yoga, meditation, or listening to music can help you relax  Spend time with friends, or do things you enjoy  Follow up with your healthcare provider as directed: You may need ongoing tests or treatment, or referral to a gastroenterologist  Write down your questions so you remember to ask them during your visits  © 2017 [address] Information is for End User's use only and may not be sold, redistributed or otherwise used for commercial purposes  All illustrations and images included in CareNotes® are the copyrighted property of [employer]  or Orville Tsang  The above information is an  only  It is not intended as medical advice for individual conditions or treatments  Talk to your doctor, nurse or pharmacist before following any medical regimen to see if it is safe and effective for you

## 2019-01-07 NOTE — ED NOTES
Patients has been asking for assistance to stop drinking  Contacted the HOST program for assessment  Patient notified    Patient's insurance is [employer] (commercial) but she dosen't have her insurance card

## 2019-01-07 NOTE — ED PROVIDER NOTES
History  Chief Complaint   Patient presents with    Abdominal Pain     epigastric pain/cramps with nausea vomiting x2 days   no diarrhea    Withdrawal - Alcohol     pt now states drinking a pint of etoh daily x2 months, last drink 2 days ago     65-year-old female presents with symptoms of alcohol withdrawal   She states that she is a history of depression which she has been self medicating with alcohol  She states that over the past 2-3 months she has been drinking at least a pt of hard liquor daily along with beer  She has been drinking this throughout the day and decided two days ago that it become too much of a problem and she should cease drinking altogether  Since that time she has developed abdominal pain nausea and vomiting along with mild tremors  She denies any history of alcohol withdrawal in the past and denies any history of alcohol withdrawal seizures  Despite her increasing depression she denies any in thoughts of suicide or self-harm  She denies hallucinations or homicidal ideation  She denies being under treatment for her depression and takes only Norvasc for her hypertension  Alcohol Problem   Severity:  Moderate  Onset quality:  Gradual  Duration:  2 days  Progression:  Worsening  Chronicity:  New  Relieved by:  Nothing  Worsened by:  Nothing  Ineffective treatments:  None tried  Associated symptoms: abdominal pain, fatigue, nausea and vomiting    Associated symptoms: no chest pain, no congestion, no cough, no diarrhea, no ear pain, no fever, no headaches, no loss of consciousness, no myalgias, no rash, no rhinorrhea, no shortness of breath, no sore throat and no wheezing        Prior to Admission Medications   Prescriptions Last Dose Informant Patient Reported? Taking?    amLODIPine (NORVASC) 2 5 mg tablet   Yes Yes   Sig: Take 5 mg by mouth daily      Facility-Administered Medications: None       Past Medical History:   Diagnosis Date    [facility]     cervical cancer    Hypertension        Past Surgical History:   Procedure Laterality Date    CERVICAL BIOPSY  W/ LOOP ELECTRODE EXCISION      TUBAL LIGATION         History reviewed  No pertinent family history  I have reviewed and agree with the history as documented  Social History   Substance Use Topics    Smoking status: Current Some Day Smoker    Smokeless tobacco: Never Used    Alcohol use Yes      Comment: daily        Review of Systems   Constitutional: Positive for fatigue  Negative for appetite change, chills and fever  HENT: Negative for congestion, ear pain, postnasal drip, rhinorrhea, sinus pain, sore throat and trouble swallowing  Eyes: Negative for redness and itching  Respiratory: Negative for cough, chest tightness, shortness of breath and wheezing  Cardiovascular: Negative for chest pain and leg swelling  Gastrointestinal: Positive for abdominal pain, nausea and vomiting  Negative for constipation and diarrhea  Endocrine: Negative  Genitourinary: Negative for difficulty urinating and dysuria  Musculoskeletal: Negative for back pain and myalgias  Skin: Negative for rash  Allergic/Immunologic: Negative  Neurological: Positive for weakness (Generalized)  Negative for dizziness, seizures, loss of consciousness, speech difficulty, light-headedness, numbness and headaches  Hematological: Negative  Psychiatric/Behavioral: Negative  Physical Exam  Physical Exam   Constitutional: She is oriented to person, place, and time  She appears well-developed and well-nourished  HENT:   Head: Normocephalic and atraumatic  Nose: Nose normal    Mouth/Throat: Oropharynx is clear and moist  Mucous membranes are dry  Eyes: Pupils are equal, round, and reactive to light  Conjunctivae and EOM are normal    Neck: Normal range of motion  Neck supple  Cardiovascular: Regular rhythm, normal heart sounds and normal pulses  Tachycardia present      Pulmonary/Chest: Effort normal and breath sounds normal  No respiratory distress  She has no wheezes  Abdominal: Soft  Bowel sounds are normal  There is tenderness (Epigastric)  There is no guarding  Musculoskeletal: She exhibits no edema, tenderness or deformity  Neurological: She is alert and oriented to person, place, and time  Skin: Skin is warm and dry  Capillary refill takes less than 2 seconds  No rash noted  Psychiatric: She has a normal mood and affect  Her behavior is normal    Nursing note and vitals reviewed        Vital Signs  ED Triage Vitals [01/07/19 0715]   Temperature Pulse Respirations Blood Pressure SpO2   (!) 97 3 °F (36 3 °C) (!) 138 16 139/81 97 %      Temp Source Heart Rate Source Patient Position - Orthostatic VS BP Location FiO2 (%)   Tympanic Monitor Sitting Left arm --      Pain Score       --           Vitals:    01/07/19 0715 01/07/19 0736 01/07/19 0916   BP: 139/81 139/81 147/92   Pulse: (!) 138  103   Patient Position - Orthostatic VS: Sitting  Lying       Visual Acuity      ED Medications  Medications   sodium chloride 0 9 % bolus 1,000 mL (1,000 mL Intravenous New Bag 1/7/19 0915)   ondansetron (ZOFRAN) injection 4 mg (4 mg Intravenous Given 1/7/19 0752)   sodium chloride 0 9 % bolus 1,000 mL (0 mL Intravenous Stopped 1/7/19 0915)   pantoprazole (PROTONIX) injection 40 mg (40 mg Intravenous Given 1/7/19 0817)   LORazepam (ATIVAN) 2 mg/mL injection 1 mg (1 mg Intravenous Given 1/7/19 0817)   potassium chloride (K-DUR,KLOR-CON) CR tablet 40 mEq (40 mEq Oral Given 1/7/19 0921)   magnesium sulfate 2 g/50 mL IVPB (premix) 2 g (2 g Intravenous New Bag 1/7/19 1526)       Diagnostic Studies  Results Reviewed     Procedure Component Value Units Date/Time    Rapid drug screen, urine [[id number]]  (Normal) Collected:  01/07/19 1115    Lab Status:  Final result Specimen:  Urine from Urine, Clean Catch Updated:  01/07/19 1151     Amph/Meth UR Negative     Barbiturate Ur Negative     Benzodiazepine Urine Negative     Cocaine Urine Negative     Methadone Urine Negative     Opiate Urine Negative     PCP Ur Negative     THC Urine Negative    Narrative:         FOR MEDICAL PURPOSES ONLY  IF CONFIRMATION NEEDED PLEASE CONTACT THE LAB WITHIN 5 DAYS      Drug Screen Cutoff Levels:  AMPHETAMINE/METHAMPHETAMINES  1000 ng/mL  BARBITURATES     200 ng/mL  BENZODIAZEPINES     200 ng/mL  COCAINE      300 ng/mL  METHADONE      300 ng/mL  OPIATES      300 ng/mL  PHENCYCLIDINE     25 ng/mL  THC       50 ng/mL    Urine Microscopic [[id number]]  (Abnormal) Collected:  01/07/19 1115    Lab Status:  Final result Specimen:  Urine from Urine, Clean Catch Updated:  01/07/19 1144     RBC, UA None Seen /hpf      WBC, UA 2-4 (A) /hpf      Epithelial Cells Occasional /hpf      Bacteria, UA None Seen /hpf      MUCUS THREADS Moderate (A)    UA w Reflex to Microscopic [[id number]]  (Abnormal) Collected:  01/07/19 1115    Lab Status:  Final result Specimen:  Urine from Urine, Clean Catch Updated:  01/07/19 1137     Color, UA Malissa (A)     Clarity, UA Clear     Specific Gravity, UA 1 015     pH, UA 6 0     Leukocytes, UA 25 0 (A)     Nitrite, UA Negative     Protein, UA 30 (1+) (A) mg/dl      Glucose, UA Negative mg/dl      Ketones, UA 15 (1+) (A) mg/dl      Bilirubin, UA Negative     Blood, UA Negative     UROBILINOGEN UA 1 0 mg/dL     POCT pregnancy, urine [[id number]]  (Normal) Resulted:  01/07/19 1115    Lab Status:  Final result Updated:  01/07/19 1118     EXT PREG TEST UR (Ref: Negative) patient pregnancy test is negative    Magnesium [[id number]]  (Abnormal) Collected:  01/07/19 0751    Lab Status:  Final result Specimen:  Blood from Arm, Right Updated:  01/07/19 0818     Magnesium 1 2 (L) mg/dL     Ethanol [[id number]]  (Abnormal) Collected:  01/07/19 0751    Lab Status:  Final result Specimen:  Blood from Arm, Right Updated:  01/07/19 0817     Ethanol Lvl 94 (H) mg/dL     Comprehensive metabolic panel [[id number]]  (Abnormal) Collected:  01/07/19 0751    Lab Status:  Final result Specimen:  Blood from Arm, Right Updated:  01/07/19 0817     Sodium 141 mmol/L      Potassium 3 2 (L) mmol/L      Chloride 103 mmol/L      CO2 24 mmol/L      ANION GAP 14 mmol/L      BUN 3 (L) mg/dL      Creatinine 0 74 mg/dL      Glucose 111 (H) mg/dL      Calcium 7 6 (L) mg/dL       (H) U/L       (H) U/L      Alkaline Phosphatase 185 (H) U/L      Total Protein 7 0 g/dL      Albumin 3 7 g/dL      Total Bilirubin 1 30 (H) mg/dL      eGFR 112 ml/min/1 73sq m     Narrative:         National Kidney Disease Education Program recommendations are as follows:  GFR calculation is accurate only with a steady state creatinine  Chronic Kidney disease less than 60 ml/min/1 73 sq  meters  Kidney failure less than 15 ml/min/1 73 sq  meters      CBC and differential [[id number]]  (Abnormal) Collected:  01/07/19 0751    Lab Status:  Final result Specimen:  Blood from Arm, Right Updated:  01/07/19 0801     WBC 6 20 Thousand/uL      RBC 3 63 (L) Million/uL      Hemoglobin 13 1 g/dL      Hematocrit 39 4 %       (H) fL      MCH 36 1 (H) pg      MCHC 33 3 g/dL      RDW 20 1 (H) %      MPV 8 1 (L) fL      Platelets 796 Thousands/uL      Neutrophils Relative 69 (H) %      Lymphocytes Relative 17 (L) %      Monocytes Relative 12 (H) %      Eosinophils Relative 0 %      Basophils Relative 1 %      Neutrophils Absolute 4 30 Thousands/µL      Lymphocytes Absolute 1 10 Thousands/µL      Monocytes Absolute 0 70 Thousand/µL      Eosinophils Absolute 0 00 Thousand/µL      Basophils Absolute 0 10 Thousands/µL                  No orders to display              Procedures  Procedures       Phone Contacts  ED Phone Contact    ED Course                               MDM  Number of Diagnoses or Management Options  Alcohol abuse:   Dehydration:   Gastritis:   Hypokalemia:   Hypomagnesemia:   Vomiting:   Diagnosis management comments: 53-year-old female presents with symptoms of alcohol withdrawal   She states that her last drink was two days ago  On laboratory examination, she is found to still be under the influence of alcohol  When questioned further she admitted that her last drink was last night and that she did become intoxicated again  She states after IV fluids medications that her symptoms have improved dramatically  She met with the host program and will be following up as an outpatient  She states that upon discharge she will be following up directly  She is aware of reasons return the ER along with supportive care measures  Amount and/or Complexity of Data Reviewed  Clinical lab tests: ordered and reviewed  Tests in the medicine section of CPT®: ordered and reviewed      CritCare Time    Disposition  Final diagnoses:   Alcohol abuse   Hypokalemia   Hypomagnesemia   Gastritis   Dehydration   Vomiting     Time reflects when diagnosis was documented in both MDM as applicable and the Disposition within this note     Time User Action Codes Description Comment    1/7/2019  9:12 AM Herminio Mccauley [F10 10] Alcohol abuse     1/7/2019  9:12 AM Herminio Mccauley [E87 6] Hypokalemia     1/7/2019  9:12 AM Herminio Mccauley [E83 42] Hypomagnesemia     1/7/2019  9:12 AM Herminio Mccauley [K29 70] Gastritis     1/7/2019  9:12 AM Herminio Mccauley [E86 0] Dehydration     1/7/2019  9:12 AM Herminio Mccauley [R11 10] Vomiting       ED Disposition     None      Follow-up Information    None         Patient's Medications   Discharge Prescriptions    No medications on file     No discharge procedures on file      ED Provider  Electronically Signed by           Myla Wadsworth DO  01/07/19 4724

## 2019-02-14 ENCOUNTER — TELEPHONE (OUTPATIENT)
Dept: FAMILY MEDICINE CLINIC | Facility: CLINIC | Age: 49
End: 2019-02-14

## 2019-02-14 ENCOUNTER — OFFICE VISIT (OUTPATIENT)
Dept: FAMILY MEDICINE CLINIC | Facility: CLINIC | Age: 49
End: 2019-02-14

## 2019-02-14 VITALS
RESPIRATION RATE: 17 BRPM | TEMPERATURE: 97.8 F | SYSTOLIC BLOOD PRESSURE: 156 MMHG | DIASTOLIC BLOOD PRESSURE: 86 MMHG | WEIGHT: 203 LBS | HEART RATE: 108 BPM | OXYGEN SATURATION: 98 %

## 2019-02-14 DIAGNOSIS — Z13.1 SCREENING FOR DIABETES MELLITUS: Primary | ICD-10-CM

## 2019-02-14 DIAGNOSIS — G60.9 IDIOPATHIC PERIPHERAL NEUROPATHY: ICD-10-CM

## 2019-02-14 DIAGNOSIS — F10.20 UNCOMPLICATED ALCOHOL DEPENDENCE (HCC): ICD-10-CM

## 2019-02-14 PROBLEM — R87.619 ABNORMAL CYTOLOGY SMEAR OF CERVIX: Status: ACTIVE | Noted: 2017-04-21

## 2019-02-14 PROCEDURE — 99213 OFFICE O/P EST LOW 20 MIN: CPT | Performed by: FAMILY MEDICINE

## 2019-02-14 RX ORDER — LANOLIN ALCOHOL/MO/W.PET/CERES
100 CREAM (GRAM) TOPICAL DAILY
Qty: 30 TABLET | Refills: 2 | Status: SHIPPED | OUTPATIENT
Start: 2019-02-14 | End: 2019-06-17 | Stop reason: SDUPTHER

## 2019-02-14 RX ORDER — LANOLIN ALCOHOL/MO/W.PET/CERES
400 CREAM (GRAM) TOPICAL DAILY
Qty: 90 TABLET | Refills: 0 | Status: SHIPPED | OUTPATIENT
Start: 2019-02-14 | End: 2019-06-17 | Stop reason: SDUPTHER

## 2019-02-14 RX ORDER — CYCLOBENZAPRINE HCL 5 MG
TABLET ORAL
COMMUNITY
Start: 2018-05-29 | End: 2019-07-16 | Stop reason: ALTCHOICE

## 2019-02-14 RX ORDER — GABAPENTIN 100 MG/1
100 CAPSULE ORAL 3 TIMES DAILY
Qty: 90 CAPSULE | Refills: 0 | Status: SHIPPED | OUTPATIENT
Start: 2019-02-14 | End: 2019-03-07

## 2019-02-14 NOTE — PATIENT INSTRUCTIONS
Peripheral Neuropathy   WHAT YOU NEED TO KNOW:   What is peripheral neuropathy? Peripheral neuropathy is a condition that affects your nerves  Nerves carry information from your brain to your body  When you have neuropathy, the information does not transfer along your nerves correctly  The nerves in your legs, arms, feet, or hands are affected  It also may affect your organs, such as your lungs, stomach, bladder, or genitals  This condition may go away on its own or you may always have it  What causes peripheral neuropathy? · Trauma or pressure on the nerve: Any accident that causes nerve damage can lead to neuropathy  If you wear a cast or a splint, it may cause pressure that pinches nerves  Repeated movements, such as typing, may cause nerve pressure and pain  · Alcohol abuse:  Alcohol abuse can cause a decrease of vitamins in your body, which can cause neuropathy  · Infections: You may get neuropathy after you are exposed to certain infections, such as herpes and Lyme disease  · Health conditions or treatments:  Certain health conditions, such as rheumatoid arthritis, cancer, lupus, and inherited disorders, may cause neuropathy  Certain medical treatments, such as chemotherapy or surgery, increase your risk of peripheral neuropathy  Some medicines for heart conditions or HIV also put you at risk  Ask for more information about these or other health conditions or treatments  What are the signs and symptoms of peripheral neuropathy? Peripheral neuropathy can affect the nerves that allow you to move or to feel things  It also may affect nerves that control your body functions, such as digestion or urination   The following are common signs and symptoms:  · Pain or tingling in your legs, feet, arms, or hands that may feel sharp, stabbing, or burning     · Trouble walking or keeping your balance     · Weakness or difficulty holding things    · Loss of your sense of touch or numbness    · Bruising easily     · Trouble controlling your bladder or bowels or having sex  How is peripheral neuropathy diagnosed? Your healthcare provider will examine you and ask about your symptoms  He may ask you about your other health conditions and about your family health history  Your healthcare provider may gently touch your skin in different areas with a cotton ball or a pin  This is done to check your sense of touch  He may also check how well you can feel hot and cold  Your healthcare provider will ask you to do simple movements  For example, he may ask you to walk or to move your fingers  You may also have any of the following tests:  · Blood tests: You may need blood taken to check for conditions that may be causing your peripheral neuropathy  · An electromyography (EMG)  test measures the electrical activity of your muscles at rest and with movement  · Nerve conduction studies:  Nerve conduction studies (NCS) test how your nerves respond to stimulation  Electrodes (wires) are placed on affected areas of your body  They send electrical currents into the nerve to see how quickly it responds  · Nerve biopsies:  A sample of skin and nerve tissue is collected by biopsy  Many methods and sites may be used for a biopsy  After a site is selected and cleaned, the area is numbed  A small piece of skin is removed so that nerves in the sample can be examined  Ask for more information about nerve biopsies  How is peripheral neuropathy treated? Treatment may help relieve your pain and help you function in your daily activities  Treatment of the condition causing the peripheral neuropathy may improve your symptoms  You may need the following treatments:  · Medicines:      ¨ Antidepressants: This medicine helps to decrease or stop the symptoms of depression  It also used to help decrease pain  Take this medicine as directed  ¨ Antiseizure medicine:   This medicine is usually given to control seizures, but it also helps with nerve pain  ¨ Pain medicine: You may be given medicine to decrease pain  You may take pain medicine as a pill or apply it to your skin  Do not wait until the pain is severe before you take your medicine  · Physical therapy:  Physical and occupational therapists may help you exercise your arms, legs, and hands  They may teach you new ways to do things at home  · Transcutaneous electrical nerve stimulation: This treatment, called TENS, stimulates your nerves and may decrease your pain  Wires are attached to pads  The pads are attached to your skin  The wires send a mild current through your nerves  Do not have TENS if you have a pacemaker or are pregnant  · Brace or splint:  You may need a device that supports or holds a body part still  For example, if you have carpal tunnel syndrome, you may need to wear a wrist brace  What are the risks of peripheral neuropathy? · The needles used during EMG may cause pain when they are inserted and may leave bruises  If you have a nerve biopsy, you may lose your sense of touch in that area  You may become dependent on some medicines (you feel like you have to take them)  You may still have pain even while you are taking medicines  Peripheral neuropathy may cause permanent damage to your nerves  · You may injure yourself if you cannot control your movements  You may have a poor sense of touch and not be able to grasp things, such as zippers or keys  You may develop muscle or bone weakness and sores from lack of movement  Pain may make you feel upset or cause you to have trouble sleeping  You may get an infection or kidney disease if it is hard for you to control your bladder or bowels  You may have trouble breathing if the nerves that work with your lungs are affected  This can be life-threatening  How can I manage my peripheral neuropathy? · Avoid falls: Move with care and stand up slowly   Wear shoes that support your feet, and do not go barefoot  Ask about walking aids, such as a cane or walker  You may want to install railings or nonslip pads in your home, especially in the bathroom  Ask for more information on how to avoid falls  · Check your skin daily:  Sores can form where your skin makes contact with chairs, beds, or other body parts  They also can form under splints  Keep your skin clean, and check your skin daily for sores  · Exercise:  Ask your healthcare provider about the best exercise plan for you  Physical activity may increase your balance and strength and may decrease your pain  It is best to start exercising slowly and do more as you get stronger  When should I contact my healthcare provider? · Your pain is severe  · You cannot control your bladder  · You have trouble having sex  · You have questions or concerns about your condition or care  When should I seek immediate care or call 911? · You fall  · You cannot walk at all  CARE AGREEMENT:   You have the right to help plan your care  Learn about your health condition and how it may be treated  Discuss treatment options with your caregivers to decide what care you want to receive  You always have the right to refuse treatment  The above information is an  only  It is not intended as medical advice for individual conditions or treatments  Talk to your doctor, nurse or pharmacist before following any medical regimen to see if it is safe and effective for you  © 2017 [employer] Information is for End User's use only and may not be sold, redistributed or otherwise used for commercial purposes  All illustrations and images included in CareNotes® are the copyrighted property of [employer]  or [employer]

## 2019-02-14 NOTE — PROGRESS NOTES
Assessment/Plan:    Idiopathic peripheral neuropathy  Does report abstaining manuel alcohol but chart review reports ED visit for EtOH withdraw as recent as last month  Reports a proper diet and taking a daily mvm  Will check blood work including Vitamin b12, hgba1c, and autoimmune markers  Female in her forties places autoimmune likely  Will RTC in 1 week to follow up blood work results  If initial workup benign may need to be evaluated with EMG or nerve conduction studies  She does have decreases sensation of feet bilaterally  No visible skin changes or decreased DTR's  Will try gabapentin 100mg TID at this time for burning pain  Diagnoses and all orders for this visit:    Screening for diabetes mellitus  -     Hemoglobin A1C; Future    Idiopathic peripheral neuropathy  -     Vitamin B12; Future  -     TSH, 3rd generation with Free T4 reflex; Future  -     Basic metabolic panel; Future  -     Protein electrophoresis, serum; Future  -     Protein electrophoresis, urine  -     ANGELICA Screen w/ Reflex to Titer/Pattern; Future  -     Sedimentation rate, automated; Future  -     gabapentin (NEURONTIN) 100 mg capsule; Take 1 capsule (100 mg total) by mouth 3 (three) times a day for 30 days  -     thiamine 100 MG tablet; Take 1 tablet (100 mg total) by mouth daily for 90 days  -     folic acid (FOLVITE) 545 mcg tablet; Take 1 tablet (400 mcg total) by mouth daily for 90 days    Uncomplicated alcohol dependence (HCC)  -     thiamine 100 MG tablet; Take 1 tablet (100 mg total) by mouth daily for 90 days  -     folic acid (FOLVITE) 369 mcg tablet; Take 1 tablet (400 mcg total) by mouth daily for 90 days    Other orders  -     cyclobenzaprine (FLEXERIL) 5 mg tablet; take 1 tablet by oral route 3 times every day as needed for muscle spasms          Subjective:      Patient ID: Holley Irwin is a 54 y o  female  Patient presents today for an acute visit for bilateral foot pain    She presents today for bilateral acute foot pain  Patient states pain began 2 weeks ago started with her toes and now include her whole foot and her calves  Pain is described as a 10/10 sharp shooting pain that starts at her toes and goes up to below her knee  States the pain is worse at her toes states she feels like they are on fire constantly  She is putting her socks and shoes on make the pain unbearable, has caused her to decreased sleep, and she is having difficulty ambulating at work  She denies any recent change in her diet, does report taking a multivitamin that she started about a month ago brand is 1 a day  But otherwise denies any other changes in activity level, new exercises, skin cream, or medications  She does have a history of alcohol abuse, reports to PCP that she has not had a drink in several months  However upon chart review she was chest seen in September, November and January in the ED for alcohol withdrawal and reported drinking a pint of liquor a day and active alcohol abuse  The following portions of the patient's history were reviewed and updated as appropriate: allergies, current medications, past family history, past medical history, past social history, past surgical history and problem list     Review of Systems   Constitutional: Negative for activity change, appetite change, fatigue and fever  HENT: Negative for drooling and sore throat  Eyes: Negative for pain and visual disturbance  Respiratory: Negative for cough, chest tightness and shortness of breath  Cardiovascular: Negative for chest pain and palpitations  Gastrointestinal: Negative for abdominal pain, constipation, diarrhea and nausea  Genitourinary: Negative for dysuria and hematuria  Musculoskeletal: Negative for back pain and myalgias  Bilateral burning foot pain     Skin: Negative for color change  Neurological: Negative for numbness and headaches  Psychiatric/Behavioral: Negative for hallucinations and suicidal ideas  Objective:      /86 (BP Location: Left arm, Patient Position: Sitting, Cuff Size: Adult)   Pulse (!) 108   Temp 97 8 °F (36 6 °C) (Temporal)   Resp 17   Wt 92 1 kg (203 lb)   SpO2 98%     Patient's shoes and socks removed  Right Foot/Ankle   Right Foot Inspection  Skin Exam: skin normal and skin intact no dry skin, no warmth, no callus, no erythema, no maceration, no abnormal color, no pre-ulcer, no ulcer and no callus                          Toe Exam: tendernessno swelling and erythema  Sensory       Monofilament testing: diminished  Vascular  Capillary refills: < 3 seconds  The right DP pulse is 2+  The right PT pulse is 2+  Left Foot/Ankle  Left Foot Inspection  Skin Exam: skin normal and skin intactno dry skin, no warmth, no erythema, no maceration, normal color, no pre-ulcer, no ulcer and no callus                         Toe Exam: tendernessno swelling and no erythema                   Sensory       Monofilament: diminished  Vascular  Capillary refills: < 3 seconds  The left DP pulse is 2+  The left PT pulse is 2+  Assign Risk Category:  No deformity present; Loss of protective sensation; No weak pulses       Risk: 1           Physical Exam   Constitutional: She is oriented to person, place, and time  She appears well-developed and well-nourished  HENT:   Head: Normocephalic and atraumatic  Right Ear: External ear normal    Left Ear: External ear normal    Eyes: Pupils are equal, round, and reactive to light  Neck: Normal range of motion  Neck supple  Cardiovascular: Normal rate, regular rhythm, normal heart sounds and intact distal pulses  Pulses are no weak pulses  Pulses:       Dorsalis pedis pulses are 2+ on the right side, and 2+ on the left side  Posterior tibial pulses are 2+ on the right side, and 2+ on the left side  Pulmonary/Chest: Effort normal and breath sounds normal  No respiratory distress  Abdominal: Soft   Bowel sounds are normal  She exhibits no distension  There is no tenderness  Musculoskeletal: Normal range of motion  She exhibits no edema  Feet:    Feet:   Right Foot:   Skin Integrity: Negative for ulcer, skin breakdown, erythema, warmth, callus or dry skin  Left Foot:   Skin Integrity: Negative for ulcer, skin breakdown, erythema, warmth, callus or dry skin  Lymphadenopathy:     She has no cervical adenopathy  Neurological: She is alert and oriented to person, place, and time  She displays normal reflexes  She exhibits normal muscle tone  Skin: Skin is warm and dry  Psychiatric: She has a normal mood and affect

## 2019-02-14 NOTE — ASSESSMENT & PLAN NOTE
Does report abstaining manuel alcohol but chart review reports ED visit for EtOH withdraw as recent as last month  Reports a proper diet and taking a daily mvm  Will check blood work including Vitamin b12, hgba1c, and autoimmune markers  Female in her forties places autoimmune likely  Will RTC in 1 week to follow up blood work results  If initial workup benign may need to be evaluated with EMG or nerve conduction studies  She does have decreases sensation of feet bilaterally  No visible skin changes or decreased DTR's  Will try gabapentin 100mg TID at this time for burning pain

## 2019-02-14 NOTE — TELEPHONE ENCOUNTER
pls coco     Return in about 1 month (around 3/14/2019) for with PCP to follow HTN and peripheral neuropathy

## 2019-02-25 ENCOUNTER — TELEPHONE (OUTPATIENT)
Dept: FAMILY MEDICINE CLINIC | Facility: CLINIC | Age: 49
End: 2019-02-25

## 2019-02-25 NOTE — TELEPHONE ENCOUNTER
Pt called stating she had passed out due to gabapatin and is unwilling to retake  Pls reach out to pt she would like to know what are her options besides the gabapatin  She is in a lot of pain and distress

## 2019-02-26 ENCOUNTER — TELEPHONE (OUTPATIENT)
Dept: FAMILY MEDICINE CLINIC | Facility: CLINIC | Age: 49
End: 2019-02-26

## 2019-02-28 DIAGNOSIS — G60.9 IDIOPATHIC PERIPHERAL NEUROPATHY: Primary | ICD-10-CM

## 2019-03-04 ENCOUNTER — TELEPHONE (OUTPATIENT)
Dept: FAMILY MEDICINE CLINIC | Facility: CLINIC | Age: 49
End: 2019-03-04

## 2019-03-04 ENCOUNTER — APPOINTMENT (OUTPATIENT)
Dept: LAB | Facility: HOSPITAL | Age: 49
End: 2019-03-04
Payer: COMMERCIAL

## 2019-03-04 DIAGNOSIS — G60.9 IDIOPATHIC PERIPHERAL NEUROPATHY: ICD-10-CM

## 2019-03-04 DIAGNOSIS — Z13.1 SCREENING FOR DIABETES MELLITUS: ICD-10-CM

## 2019-03-04 LAB
ANION GAP SERPL CALCULATED.3IONS-SCNC: 15 MMOL/L (ref 5–14)
BUN SERPL-MCNC: 11 MG/DL (ref 5–25)
CALCIUM SERPL-MCNC: 9.7 MG/DL (ref 8.4–10.2)
CHLORIDE SERPL-SCNC: 96 MMOL/L (ref 97–108)
CO2 SERPL-SCNC: 25 MMOL/L (ref 22–30)
CREAT SERPL-MCNC: 0.82 MG/DL (ref 0.6–1.2)
ERYTHROCYTE [SEDIMENTATION RATE] IN BLOOD: 47 MM/HOUR (ref 1–20)
EST. AVERAGE GLUCOSE BLD GHB EST-MCNC: 88 MG/DL
GFR SERPL CREATININE-BSD FRML MDRD: 99 ML/MIN/1.73SQ M
GLUCOSE P FAST SERPL-MCNC: 91 MG/DL (ref 70–99)
HBA1C MFR BLD: 4.7 % (ref 4.2–6.3)
POTASSIUM SERPL-SCNC: 3.5 MMOL/L (ref 3.6–5)
RPR SER QL: NORMAL
SODIUM SERPL-SCNC: 136 MMOL/L (ref 137–147)
TSH SERPL DL<=0.05 MIU/L-ACNC: 0.66 UIU/ML (ref 0.47–4.68)
VIT B12 SERPL-MCNC: 1715 PG/ML (ref 100–900)

## 2019-03-04 PROCEDURE — 85652 RBC SED RATE AUTOMATED: CPT

## 2019-03-04 PROCEDURE — 86038 ANTINUCLEAR ANTIBODIES: CPT

## 2019-03-04 PROCEDURE — 36415 COLL VENOUS BLD VENIPUNCTURE: CPT

## 2019-03-04 PROCEDURE — 84443 ASSAY THYROID STIM HORMONE: CPT

## 2019-03-04 PROCEDURE — 84165 PROTEIN E-PHORESIS SERUM: CPT | Performed by: PATHOLOGY

## 2019-03-04 PROCEDURE — 83036 HEMOGLOBIN GLYCOSYLATED A1C: CPT

## 2019-03-04 PROCEDURE — 86592 SYPHILIS TEST NON-TREP QUAL: CPT

## 2019-03-04 PROCEDURE — 80048 BASIC METABOLIC PNL TOTAL CA: CPT

## 2019-03-04 PROCEDURE — 82607 VITAMIN B-12: CPT

## 2019-03-04 PROCEDURE — 84165 PROTEIN E-PHORESIS SERUM: CPT

## 2019-03-04 NOTE — TELEPHONE ENCOUNTER
Patient states you told her that she needs to call and make an appt with you as soon as she gets blood work done  Patient did blood work today but you have no available appts anytime soon unless it is a double book  She does has an appt 03/18/19 with Dr Albina Pereyra  She did state that she is in severe pain with her neuropathy

## 2019-03-06 LAB
ALBUMIN SERPL ELPH-MCNC: 4.3 G/DL (ref 3.5–5)
ALBUMIN SERPL ELPH-MCNC: 54.4 % (ref 52–65)
ALPHA1 GLOB SERPL ELPH-MCNC: 0.35 G/DL (ref 0.1–0.4)
ALPHA1 GLOB SERPL ELPH-MCNC: 4.4 % (ref 2.5–5)
ALPHA2 GLOB SERPL ELPH-MCNC: 0.75 G/DL (ref 0.4–1.2)
ALPHA2 GLOB SERPL ELPH-MCNC: 9.5 % (ref 7–13)
BETA GLOB ABNORMAL SERPL ELPH-MCNC: 0.51 G/DL (ref 0.4–0.8)
BETA1 GLOB SERPL ELPH-MCNC: 6.5 % (ref 5–13)
BETA2 GLOB SERPL ELPH-MCNC: 5.9 % (ref 2–8)
BETA2+GAMMA GLOB SERPL ELPH-MCNC: 0.47 G/DL (ref 0.2–0.5)
GAMMA GLOB ABNORMAL SERPL ELPH-MCNC: 1.52 G/DL (ref 0.5–1.6)
GAMMA GLOB SERPL ELPH-MCNC: 19.3 % (ref 12–22)
IGG/ALB SER: 1.19 {RATIO} (ref 1.1–1.8)
PROT PATTERN SERPL ELPH-IMP: NORMAL
PROT SERPL-MCNC: 7.9 G/DL (ref 6.4–8.2)
RYE IGE QN: NEGATIVE

## 2019-03-07 ENCOUNTER — OFFICE VISIT (OUTPATIENT)
Dept: FAMILY MEDICINE CLINIC | Facility: CLINIC | Age: 49
End: 2019-03-07

## 2019-03-07 VITALS
WEIGHT: 199 LBS | HEART RATE: 92 BPM | OXYGEN SATURATION: 98 % | SYSTOLIC BLOOD PRESSURE: 178 MMHG | TEMPERATURE: 97.2 F | DIASTOLIC BLOOD PRESSURE: 102 MMHG | RESPIRATION RATE: 16 BRPM

## 2019-03-07 DIAGNOSIS — G60.9 IDIOPATHIC PERIPHERAL NEUROPATHY: Primary | ICD-10-CM

## 2019-03-07 PROCEDURE — 99213 OFFICE O/P EST LOW 20 MIN: CPT | Performed by: FAMILY MEDICINE

## 2019-03-18 NOTE — PROGRESS NOTES
Assessment/Plan:    Idiopathic peripheral neuropathy  She did not tolerate gabapentin, states she "passed out" at work  Can barely stand at work, needs to sit down frequently due to foot pain  Employer requiring she take an FMLA leave until symptoms improve, filled out  She has not picked up folic acid or thiamine supplements yet, counseled to  and start taking as soon as possible  Denies alcohol abuse since mid February  Discussed case with Dr Tabitha Burris due to acute onset and severity of symptoms, agreed with preliminary labs and asked to send referral ASAP and to add on LE b/l EMG's  Diagnoses and all orders for this visit:    Idiopathic peripheral neuropathy  -     Cancel: Ambulatory referral to Neurology; Future  -     EMG 2 limb lower extremity; Future  -     Ambulatory referral to Neurology; Future          Subjective:      Patient ID: Ana María Cortés is a 54 y o  female  She presents today for follow up of acute peripheral neuropathy  The following portions of the patient's history were reviewed and updated as appropriate: allergies, current medications, past family history, past medical history, past social history, past surgical history and problem list     Review of Systems   Constitutional: Negative for activity change, appetite change, fatigue and fever  HENT: Negative for drooling and sore throat  Eyes: Negative for pain and visual disturbance  Respiratory: Negative for cough, chest tightness and shortness of breath  Cardiovascular: Negative for chest pain and palpitations  Gastrointestinal: Negative for abdominal pain, constipation, diarrhea and nausea  Genitourinary: Negative for dysuria and hematuria  Musculoskeletal: Negative for back pain and myalgias  Skin: Negative for color change  Neurological: Negative for dizziness, tremors, seizures, facial asymmetry, weakness, light-headedness, numbness and headaches          Bilateral LE foot pain, worse at toes and shoots up to below knees  Psychiatric/Behavioral: Negative for hallucinations and suicidal ideas  Objective:      BP (!) 178/102 (BP Location: Left arm, Patient Position: Sitting, Cuff Size: Adult)   Pulse 92   Temp (!) 97 2 °F (36 2 °C) (Temporal)   Resp 16   Wt 90 3 kg (199 lb)   SpO2 98%          Physical Exam   Constitutional: She is oriented to person, place, and time  She appears well-developed and well-nourished  HENT:   Head: Normocephalic and atraumatic  Right Ear: External ear normal    Left Ear: External ear normal    Eyes: Pupils are equal, round, and reactive to light  Neck: Normal range of motion  Neck supple  Cardiovascular: Normal rate, regular rhythm, normal heart sounds and intact distal pulses  Pulmonary/Chest: Effort normal and breath sounds normal  No respiratory distress  Abdominal: Soft  Bowel sounds are normal  She exhibits no distension  There is no tenderness  Musculoskeletal: Normal range of motion  She exhibits no edema  Lymphadenopathy:     She has no cervical adenopathy  Neurological: She is alert and oriented to person, place, and time  No cranial nerve deficit or sensory deficit  Severe parasthesias from toes to below knees bilaterally, equal, improve with rest  She cannot even tolerate a blanket over her legs at bedtime due to severe "burning" pain  Worse at toes and "shoots up"  No skin changes  Skin: Skin is warm and dry  Psychiatric: She has a normal mood and affect

## 2019-03-18 NOTE — ASSESSMENT & PLAN NOTE
She did not tolerate gabapentin, states she "passed out" at work  Can barely stand at work, needs to sit down frequently due to foot pain  Employer requiring she take an FMLA leave until symptoms improve, filled out  She has not picked up folic acid or thiamine supplements yet, counseled to  and start taking as soon as possible  Denies alcohol abuse since mid February  Discussed case with Dr Radha Emmanuel due to acute onset and severity of symptoms, agreed with preliminary labs and asked to send referral ASAP and to add on LE b/l EMG's

## 2019-03-21 ENCOUNTER — OFFICE VISIT (OUTPATIENT)
Dept: FAMILY MEDICINE CLINIC | Facility: CLINIC | Age: 49
End: 2019-03-21

## 2019-03-21 ENCOUNTER — DOCUMENTATION (OUTPATIENT)
Dept: FAMILY MEDICINE CLINIC | Facility: CLINIC | Age: 49
End: 2019-03-21

## 2019-03-21 VITALS
HEART RATE: 96 BPM | SYSTOLIC BLOOD PRESSURE: 152 MMHG | DIASTOLIC BLOOD PRESSURE: 92 MMHG | RESPIRATION RATE: 16 BRPM | TEMPERATURE: 97.4 F | OXYGEN SATURATION: 98 % | WEIGHT: 193 LBS

## 2019-03-21 DIAGNOSIS — G60.9 IDIOPATHIC PERIPHERAL NEUROPATHY: Primary | ICD-10-CM

## 2019-03-21 PROCEDURE — 99213 OFFICE O/P EST LOW 20 MIN: CPT | Performed by: FAMILY MEDICINE

## 2019-03-21 NOTE — PROGRESS NOTES
Dr Shayna Myers asked me to speak with Kendra Kwok  According to Dr Aguilera Presume has a history of drinking  She has had a relapsed recently  I spoke with her and about the stressors in her life  We scheduled an appointment for a follow up

## 2019-03-22 NOTE — PATIENT INSTRUCTIONS
Gabapentin (By mouth)   Gabapentin (shivani-a-PEN-tin)  Treats seizures and pain caused by shingles  Brand Name(s): ACTIVE-PAC with Gabapentin, Convenience Oswald, Cyclo/Tremaine 10/300 Pack, FusePaq Fanatrex, Tremaine-V, Gralise, [address] Pack, Neurontin, SmartRx Tremaine Kit   There may be other brand names for this medicine  When This Medicine Should Not Be Used: This medicine is not right for everyone  Do not use it if you had an allergic reaction to gabapentin  How to Use This Medicine:   Capsule, Liquid, Tablet  · Take your medicine as directed  Your dose may need to be changed several times to find what works best for you  If you have epilepsy, do not allow more than 12 hours to pass between doses  · Capsule: Swallow the capsule whole with plenty of water  Do not open, crush, or chew it  · Gralise® tablet: Swallow the tablet whole   Do not crush, break, or chew it  · Neurontin® tablet: If you break a tablet into 2 pieces, use the second half as your next dose  If you don't use it within 28 days, throw it away  · Measure the oral liquid medicine with a marked measuring spoon, oral syringe, or medicine cup  · This medicine should come with a Medication Guide  Ask your pharmacist for a copy if you do not have one  · Missed dose: Take a dose as soon as you remember  If it is almost time for your next dose, wait until then and take a regular dose  Do not take extra medicine to make up for a missed dose  · Store the medicine in a closed container at room temperature, away from heat, moisture, and direct light  Store the Neurontin® oral liquid in the refrigerator  Do not freeze  Drugs and Foods to Avoid:   Ask your doctor or pharmacist before using any other medicine, including over-the-counter medicines, vitamins, and herbal products  · Some medicines can affect how gabapentin works   Tell your doctor if you also use any of the following:   ¨ Hydrocodone  ¨ Morphine  · If you take an antacid, wait at least 2 hours before you take gabapentin  · Tell your doctor if you use anything else that makes you sleepy  Some examples are allergy medicine, narcotic pain medicine, and alcohol  Warnings While Using This Medicine:   · Tell your doctor if you are pregnant or breastfeeding, or if you have kidney problems or are receiving dialysis  Tell your doctor if you have a history of depression or mental health problems  · This medicine may increase depression or thoughts of suicide  Tell your doctor right away if you start to feel more depressed or think about hurting yourself  · This medicine may cause a serious allergic reaction called multiorgan hypersensitivity, which can damage organs and be life-threatening  · Do not stop using this medicine suddenly  Your doctor will need to slowly decrease your dose before you stop it completely  If you take this medicine to prevent seizures, your seizures may return or occur more often if you stop this medicine suddenly  · This medicine may make you dizzy or drowsy  Do not drive or do anything else that could be dangerous until you know how this medicine affects you  · Tell any doctor or dentist who treats you that you are using this medicine  This medicine may affect certain medical test results  · Your doctor will check your progress and the effects of this medicine at regular visits  Keep all appointments  · Keep all medicine out of the reach of children  Never share your medicine with anyone    Possible Side Effects While Using This Medicine:   Call your doctor right away if you notice any of these side effects:  · Allergic reaction: Itching or hives, swelling in your face or hands, swelling or tingling in your mouth or throat, chest tightness, trouble breathing  · Behavior problems, aggression, restlessness, trouble concentrating, moodiness (especially in children)  · Blistering, peeling, red skin rash  · Change in how much or how often you urinate, bloody or cloudy urine,  · Chest pain, fast heartbeat, trouble breathing  · Dark urine or pale stools, nausea, vomiting, loss of appetite, stomach pain, yellow skin or eyes  · Fever, rash, swollen or tender glands in the neck, armpit, or groin  · Problems with coordination, shakiness, unsteadiness  · Rapid weight gain, swelling in your hands, ankles, or feet  · Unusual moods or behaviors, thoughts of hurting yourself, feeling depressed  If you notice these less serious side effects, talk with your doctor:   · Dizziness, drowsiness, sleepiness, tiredness  If you notice other side effects that you think are caused by this medicine, tell your doctor  Call your doctor for medical advice about side effects  You may report side effects to FDA at 0-458-FDA-5194  © 2017 [employer] Information is for End User's use only and may not be sold, redistributed or otherwise used for commercial purposes  The above information is an  only  It is not intended as medical advice for individual conditions or treatments  Talk to your doctor, nurse or pharmacist before following any medical regimen to see if it is safe and effective for you

## 2019-03-22 NOTE — ASSESSMENT & PLAN NOTE
Advised to attempt gabapentin 100 mg q h s     And to keep appointment with neurologist on April 11th for full workup of peripheral neuropathy and to keep appointment on May 4th for EMG is    Counseled to call the clinic if there is any change in her symptoms and to go to the ED the headache becomes severe patient verbalized understanding

## 2019-04-03 ENCOUNTER — TELEPHONE (OUTPATIENT)
Dept: FAMILY MEDICINE CLINIC | Facility: CLINIC | Age: 49
End: 2019-04-03

## 2019-04-11 ENCOUNTER — DOCUMENTATION (OUTPATIENT)
Dept: FAMILY MEDICINE CLINIC | Facility: CLINIC | Age: 49
End: 2019-04-11

## 2019-04-16 ENCOUNTER — HOSPITAL ENCOUNTER (OUTPATIENT)
Dept: NEUROLOGY | Facility: HOSPITAL | Age: 49
Discharge: HOME | End: 2019-04-16
Payer: COMMERCIAL

## 2019-04-16 DIAGNOSIS — G60.9 IDIOPATHIC PERIPHERAL NEUROPATHY: ICD-10-CM

## 2019-04-16 PROCEDURE — 95909 NRV CNDJ TST 5-6 STUDIES: CPT | Performed by: PSYCHIATRY & NEUROLOGY

## 2019-04-16 PROCEDURE — 95885 MUSC TST DONE W/NERV TST LIM: CPT | Performed by: PSYCHIATRY & NEUROLOGY

## 2019-04-16 PROCEDURE — NC001 PR NO CHARGE: Performed by: PSYCHIATRY & NEUROLOGY

## 2019-05-10 ENCOUNTER — TELEPHONE (OUTPATIENT)
Dept: FAMILY MEDICINE CLINIC | Facility: CLINIC | Age: 49
End: 2019-05-10

## 2019-05-13 ENCOUNTER — TELEPHONE (OUTPATIENT)
Dept: FAMILY MEDICINE CLINIC | Facility: CLINIC | Age: 49
End: 2019-05-13

## 2019-05-16 ENCOUNTER — OFFICE VISIT (OUTPATIENT)
Dept: FAMILY MEDICINE CLINIC | Facility: CLINIC | Age: 49
End: 2019-05-16

## 2019-05-16 VITALS
SYSTOLIC BLOOD PRESSURE: 140 MMHG | DIASTOLIC BLOOD PRESSURE: 84 MMHG | OXYGEN SATURATION: 97 % | TEMPERATURE: 98.3 F | RESPIRATION RATE: 16 BRPM | HEART RATE: 93 BPM | WEIGHT: 190 LBS

## 2019-05-16 DIAGNOSIS — G62.1 ALCOHOLIC POLYNEUROPATHY (HCC): Primary | ICD-10-CM

## 2019-05-16 PROCEDURE — 99213 OFFICE O/P EST LOW 20 MIN: CPT | Performed by: FAMILY MEDICINE

## 2019-05-16 RX ORDER — MULTIVITAMIN
1 TABLET ORAL DAILY
COMMUNITY

## 2019-06-17 ENCOUNTER — OFFICE VISIT (OUTPATIENT)
Dept: FAMILY MEDICINE CLINIC | Facility: CLINIC | Age: 49
End: 2019-06-17

## 2019-06-17 ENCOUNTER — TELEPHONE (OUTPATIENT)
Dept: FAMILY MEDICINE CLINIC | Facility: CLINIC | Age: 49
End: 2019-06-17

## 2019-06-17 VITALS
HEIGHT: 68 IN | HEART RATE: 88 BPM | DIASTOLIC BLOOD PRESSURE: 100 MMHG | BODY MASS INDEX: 27.89 KG/M2 | TEMPERATURE: 97.8 F | RESPIRATION RATE: 16 BRPM | WEIGHT: 184 LBS | SYSTOLIC BLOOD PRESSURE: 180 MMHG | OXYGEN SATURATION: 98 %

## 2019-06-17 DIAGNOSIS — Z12.39 BREAST CANCER SCREENING: Primary | ICD-10-CM

## 2019-06-17 DIAGNOSIS — E78.2 HYPERLIPEMIA, MIXED: ICD-10-CM

## 2019-06-17 DIAGNOSIS — G60.9 IDIOPATHIC PERIPHERAL NEUROPATHY: ICD-10-CM

## 2019-06-17 DIAGNOSIS — F10.20 UNCOMPLICATED ALCOHOL DEPENDENCE (HCC): ICD-10-CM

## 2019-06-17 DIAGNOSIS — I10 ESSENTIAL HYPERTENSION: ICD-10-CM

## 2019-06-17 PROCEDURE — 99213 OFFICE O/P EST LOW 20 MIN: CPT | Performed by: FAMILY MEDICINE

## 2019-06-17 RX ORDER — LANOLIN ALCOHOL/MO/W.PET/CERES
400 CREAM (GRAM) TOPICAL DAILY
Qty: 90 TABLET | Refills: 2 | Status: SHIPPED | OUTPATIENT
Start: 2019-06-17 | End: 2019-09-15

## 2019-06-17 RX ORDER — LANOLIN ALCOHOL/MO/W.PET/CERES
100 CREAM (GRAM) TOPICAL DAILY
Qty: 30 TABLET | Refills: 2 | Status: SHIPPED | OUTPATIENT
Start: 2019-06-17 | End: 2019-09-15

## 2019-06-21 ENCOUNTER — APPOINTMENT (EMERGENCY)
Dept: RADIOLOGY | Facility: HOSPITAL | Age: 49
End: 2019-06-21
Payer: COMMERCIAL

## 2019-06-21 ENCOUNTER — HOSPITAL ENCOUNTER (EMERGENCY)
Facility: HOSPITAL | Age: 49
Discharge: HOME | End: 2019-06-21
Attending: EMERGENCY MEDICINE
Payer: COMMERCIAL

## 2019-06-21 VITALS
DIASTOLIC BLOOD PRESSURE: 94 MMHG | BODY MASS INDEX: 28.46 KG/M2 | RESPIRATION RATE: 17 BRPM | WEIGHT: 187.2 LBS | OXYGEN SATURATION: 100 % | HEART RATE: 76 BPM | SYSTOLIC BLOOD PRESSURE: 154 MMHG | TEMPERATURE: 97.7 F

## 2019-06-21 DIAGNOSIS — S20.211A RIB CONTUSION, RIGHT, INITIAL ENCOUNTER: Primary | ICD-10-CM

## 2019-06-21 PROCEDURE — 99282 EMERGENCY DEPT VISIT SF MDM: CPT | Performed by: PHYSICIAN ASSISTANT

## 2019-06-21 PROCEDURE — 99283 EMERGENCY DEPT VISIT LOW MDM: CPT

## 2019-06-21 PROCEDURE — 71101 X-RAY EXAM UNILAT RIBS/CHEST: CPT

## 2019-06-21 RX ORDER — LIDOCAINE 50 MG/G
1 PATCH TOPICAL DAILY
Qty: 10 PATCH | Refills: 0 | Status: SHIPPED | OUTPATIENT
Start: 2019-06-21 | End: 2019-07-16 | Stop reason: ALTCHOICE

## 2019-06-24 ENCOUNTER — TELEPHONE (OUTPATIENT)
Dept: FAMILY MEDICINE CLINIC | Facility: CLINIC | Age: 49
End: 2019-06-24

## 2019-07-16 ENCOUNTER — HOSPITAL ENCOUNTER (EMERGENCY)
Facility: HOSPITAL | Age: 49
Discharge: HOME | End: 2019-07-16
Attending: EMERGENCY MEDICINE
Payer: COMMERCIAL

## 2019-07-16 ENCOUNTER — APPOINTMENT (EMERGENCY)
Dept: CT IMAGING | Facility: HOSPITAL | Age: 49
End: 2019-07-16
Payer: COMMERCIAL

## 2019-07-16 VITALS
DIASTOLIC BLOOD PRESSURE: 90 MMHG | SYSTOLIC BLOOD PRESSURE: 161 MMHG | WEIGHT: 185 LBS | HEART RATE: 68 BPM | RESPIRATION RATE: 16 BRPM | BODY MASS INDEX: 28.13 KG/M2 | OXYGEN SATURATION: 98 % | TEMPERATURE: 96 F

## 2019-07-16 DIAGNOSIS — F10.929 ALCOHOL INTOXICATION (HCC): Primary | ICD-10-CM

## 2019-07-16 DIAGNOSIS — H53.8 BLURRY VISION: ICD-10-CM

## 2019-07-16 LAB
ALBUMIN SERPL BCP-MCNC: 3.7 G/DL (ref 3–5.2)
ALP SERPL-CCNC: 191 U/L (ref 43–122)
ALT SERPL W P-5'-P-CCNC: 111 U/L (ref 9–52)
AMPHETAMINES SERPL QL SCN: NEGATIVE
ANION GAP SERPL CALCULATED.3IONS-SCNC: 13 MMOL/L (ref 5–14)
AST SERPL W P-5'-P-CCNC: 321 U/L (ref 14–36)
ATRIAL RATE: 67 BPM
BARBITURATES UR QL: NEGATIVE
BASOPHILS # BLD AUTO: 0.1 THOUSANDS/ΜL (ref 0–0.1)
BASOPHILS NFR BLD AUTO: 3 % (ref 0–1)
BENZODIAZ UR QL: NEGATIVE
BILIRUB SERPL-MCNC: 1.8 MG/DL
BUN SERPL-MCNC: 5 MG/DL (ref 5–25)
CALCIUM SERPL-MCNC: 8.5 MG/DL (ref 8.4–10.2)
CHLORIDE SERPL-SCNC: 102 MMOL/L (ref 97–108)
CO2 SERPL-SCNC: 28 MMOL/L (ref 22–30)
COCAINE UR QL: NEGATIVE
CREAT SERPL-MCNC: 0.56 MG/DL (ref 0.6–1.2)
D-DIMER: 0.46 MG/L
EOSINOPHIL # BLD AUTO: 0 THOUSAND/ΜL (ref 0–0.4)
EOSINOPHIL NFR BLD AUTO: 1 % (ref 0–6)
ERYTHROCYTE [DISTWIDTH] IN BLOOD BY AUTOMATED COUNT: 15.5 %
ETHANOL EXG-MCNC: 0.29 MG/DL
EXT PREG TEST URINE: NEGATIVE
EXT. CONTROL ED NAV: NORMAL
GFR SERPL CREATININE-BSD FRML MDRD: 128 ML/MIN/1.73SQ M
GLUCOSE SERPL-MCNC: 78 MG/DL (ref 70–99)
HCT VFR BLD AUTO: 36.4 % (ref 36–46)
HGB BLD-MCNC: 12.3 G/DL (ref 12–16)
LYMPHOCYTES # BLD AUTO: 3.1 THOUSANDS/ΜL (ref 0.5–4)
LYMPHOCYTES NFR BLD AUTO: 54 % (ref 25–45)
MCH RBC QN AUTO: 35.5 PG (ref 26–34)
MCHC RBC AUTO-ENTMCNC: 33.8 G/DL (ref 31–36)
MCV RBC AUTO: 105 FL (ref 80–100)
METHADONE UR QL: NEGATIVE
MONOCYTES # BLD AUTO: 0.5 THOUSAND/ΜL (ref 0.2–0.9)
MONOCYTES NFR BLD AUTO: 9 % (ref 1–10)
NEUTROPHILS # BLD AUTO: 1.9 THOUSANDS/ΜL (ref 1.8–7.8)
NEUTS SEG NFR BLD AUTO: 34 % (ref 45–65)
OPIATES UR QL SCN: NEGATIVE
P AXIS: 53 DEGREES
PCP UR QL: NEGATIVE
PLATELET # BLD AUTO: 184 THOUSANDS/UL (ref 150–450)
PLATELET BLD QL SMEAR: ADEQUATE
PMV BLD AUTO: 8.9 FL (ref 8.9–12.7)
POTASSIUM SERPL-SCNC: 3.4 MMOL/L (ref 3.6–5)
PR INTERVAL: 152 MS
PROT SERPL-MCNC: 7.2 G/DL (ref 5.9–8.4)
QRS AXIS: -42 DEGREES
QRSD INTERVAL: 104 MS
QT INTERVAL: 454 MS
QTC INTERVAL: 479 MS
RBC # BLD AUTO: 3.46 MILLION/UL (ref 4–5.2)
RBC MORPH BLD: NORMAL
SODIUM SERPL-SCNC: 143 MMOL/L (ref 137–147)
T WAVE AXIS: 34 DEGREES
THC UR QL: NEGATIVE
TROPONIN I SERPL-MCNC: <0.01 NG/ML (ref 0–0.03)
TSH SERPL DL<=0.05 MIU/L-ACNC: 0.59 UIU/ML (ref 0.47–4.68)
VENTRICULAR RATE: 67 BPM
WBC # BLD AUTO: 5.6 THOUSAND/UL (ref 4.5–11)

## 2019-07-16 PROCEDURE — 99285 EMERGENCY DEPT VISIT HI MDM: CPT

## 2019-07-16 PROCEDURE — 84443 ASSAY THYROID STIM HORMONE: CPT | Performed by: EMERGENCY MEDICINE

## 2019-07-16 PROCEDURE — 93005 ELECTROCARDIOGRAM TRACING: CPT

## 2019-07-16 PROCEDURE — 93010 ELECTROCARDIOGRAM REPORT: CPT | Performed by: INTERNAL MEDICINE

## 2019-07-16 PROCEDURE — 99284 EMERGENCY DEPT VISIT MOD MDM: CPT | Performed by: EMERGENCY MEDICINE

## 2019-07-16 PROCEDURE — 96361 HYDRATE IV INFUSION ADD-ON: CPT

## 2019-07-16 PROCEDURE — 85379 FIBRIN DEGRADATION QUANT: CPT | Performed by: EMERGENCY MEDICINE

## 2019-07-16 PROCEDURE — 85025 COMPLETE CBC W/AUTO DIFF WBC: CPT | Performed by: EMERGENCY MEDICINE

## 2019-07-16 PROCEDURE — 80053 COMPREHEN METABOLIC PANEL: CPT | Performed by: EMERGENCY MEDICINE

## 2019-07-16 PROCEDURE — 36415 COLL VENOUS BLD VENIPUNCTURE: CPT | Performed by: EMERGENCY MEDICINE

## 2019-07-16 PROCEDURE — 96374 THER/PROPH/DIAG INJ IV PUSH: CPT

## 2019-07-16 PROCEDURE — 82075 ASSAY OF BREATH ETHANOL: CPT | Performed by: EMERGENCY MEDICINE

## 2019-07-16 PROCEDURE — 70450 CT HEAD/BRAIN W/O DYE: CPT

## 2019-07-16 PROCEDURE — 80307 DRUG TEST PRSMV CHEM ANLYZR: CPT | Performed by: EMERGENCY MEDICINE

## 2019-07-16 PROCEDURE — 84484 ASSAY OF TROPONIN QUANT: CPT | Performed by: EMERGENCY MEDICINE

## 2019-07-16 PROCEDURE — 81025 URINE PREGNANCY TEST: CPT | Performed by: EMERGENCY MEDICINE

## 2019-07-16 RX ORDER — ONDANSETRON 2 MG/ML
4 INJECTION INTRAMUSCULAR; INTRAVENOUS ONCE
Status: COMPLETED | OUTPATIENT
Start: 2019-07-16 | End: 2019-07-16

## 2019-07-16 RX ORDER — PROMETHAZINE HYDROCHLORIDE 25 MG/ML
25 INJECTION, SOLUTION INTRAMUSCULAR; INTRAVENOUS ONCE
Status: DISCONTINUED | OUTPATIENT
Start: 2019-07-16 | End: 2019-07-16

## 2019-07-16 RX ORDER — TETRACAINE HYDROCHLORIDE 5 MG/ML
1 SOLUTION OPHTHALMIC ONCE
Status: COMPLETED | OUTPATIENT
Start: 2019-07-16 | End: 2019-07-16

## 2019-07-16 RX ADMIN — FLUORESCEIN SODIUM 1 STRIP: 1 STRIP OPHTHALMIC at 07:48

## 2019-07-16 RX ADMIN — TETRACAINE HYDROCHLORIDE 1 DROP: 5 SOLUTION OPHTHALMIC at 07:48

## 2019-07-16 RX ADMIN — SODIUM CHLORIDE 1000 ML: 0.9 INJECTION, SOLUTION INTRAVENOUS at 06:33

## 2019-07-16 RX ADMIN — ONDANSETRON 4 MG: 2 INJECTION, SOLUTION INTRAMUSCULAR; INTRAVENOUS at 11:58

## 2019-07-16 NOTE — ED PROVIDER NOTES
History  Chief Complaint   Patient presents with   [address]     Patient woke up with blurred vision, no other complaints of a headache or other neuro difficulties  17-year-old female, chronic alcoholic presents for evaluation of transient blurry vision, lightheadedness this morning  Patient states that she drinks daily and had 1 pint of vodka last night  She states that this morning she was very tearful, felt lightheaded and felt that her vision was off, she does states she blacked out last night and did not feel well this morning  She states that her vision got better  Denies any double vision denies any numbness or tingling in any extremity denies any chest pain or shortness of breath denies any headache  Denies using any other drugs or medications  Prior to Admission Medications   Prescriptions Last Dose Informant Patient Reported? Taking? Multiple Vitamin (MULTIVITAMIN) tablet 7/15/2019 at Unknown time  Yes Yes   Sig: Take 1 tablet by mouth daily   folic acid (FOLVITE) 592 mcg tablet 7/15/2019 at Unknown time  No Yes   Sig: Take 1 tablet (400 mcg total) by mouth daily for 90 days   thiamine 100 MG tablet 7/15/2019 at Unknown time  No Yes   Sig: Take 1 tablet (100 mg total) by mouth daily for 90 days      Facility-Administered Medications: None       Past Medical History:   Diagnosis Date    [facility] dependence [facility] [address] )     Cancer ([facility] )     cervical cancer    Depression     Gastritis     Hypertension     Hyperthyroidism     Idiopathic peripheral neuropathy        Past Surgical History:   Procedure Laterality Date    CERVICAL BIOPSY  W/ LOOP ELECTRODE EXCISION      TUBAL LIGATION         History reviewed  No pertinent family history  I have reviewed and agree with the history as documented      Social History     Tobacco Use    Smoking status: Former Smoker    Smokeless tobacco: Former User   Substance Use Topics    Alcohol use: Yes     Comment: 1 pint vodka daily    Drug use: No        Review of Systems   Constitutional: Negative for appetite change and fever  HENT: Negative for rhinorrhea and sore throat  Eyes: Positive for visual disturbance  Negative for photophobia  Respiratory: Negative for cough, chest tightness and wheezing  Cardiovascular: Negative for chest pain, palpitations and leg swelling  Gastrointestinal: Negative for abdominal distention, abdominal pain, blood in stool, constipation and diarrhea  Genitourinary: Negative for dysuria, flank pain, frequency, hematuria and urgency  Musculoskeletal: Negative for back pain  Skin: Negative for rash  Neurological: Negative for dizziness, weakness and headaches  All other systems reviewed and are negative  Physical Exam  Physical Exam   Constitutional: She is oriented to person, place, and time  She appears well-developed and well-nourished  Tearful anxious appearing female in no acute respiratory distress   HENT:   Head: Normocephalic and atraumatic  Proptosis    Right eye, endoophthalmic exam unremarkable  Left eye unable to fully visualize retina  Bedside ultrasound without obvious retinal detachment, vitreous detachment   Eyes: Pupils are equal, round, and reactive to light  EOM are normal  Right conjunctiva is injected  Left conjunctiva is injected  Right eye exhibits no nystagmus  Left eye exhibits no nystagmus  Bilateral conjunctival injection   Neck: Normal range of motion  Neck supple  Cardiovascular: Normal rate and regular rhythm  Exam reveals no gallop and no friction rub  No murmur heard  Pulmonary/Chest: Effort normal  She has no wheezes  She has no rales  She exhibits no tenderness  Abdominal: Soft  She exhibits no distension and no mass  There is no rebound and no guarding  Neurological: She is alert and oriented to person, place, and time     Nonfocal neurologic exam including no dysmetria, extraocular movements intact, visual fields full by confrontation, muscle strength 5/5 bilateral upper and lower extremities, no pronator drift, no nystagmus, no asterixis  Skin: Skin is warm and dry  Psychiatric: She has a normal mood and affect  Nursing note and vitals reviewed  Vital Signs  ED Triage Vitals [07/16/19 0619]   Temperature Pulse Respirations Blood Pressure SpO2   (!) 96 4 °F (35 8 °C) 76 16 165/100 95 %      Temp Source Heart Rate Source Patient Position - Orthostatic VS BP Location FiO2 (%)   Tympanic Monitor Lying Left arm --      Pain Score       No Pain           Vitals:    07/16/19 1115 07/16/19 1130 07/16/19 1145 07/16/19 1305   BP:  158/94  161/90   Pulse: (!) 0 (!) 0 (!) 0 68   Patient Position - Orthostatic VS:    Sitting         Visual Acuity  Visual Acuity      Most Recent Value   Visual acuity R eye is  20/100   Visual acuity Left eye is  20/40   Visual acuity in both eyes is  20/40          ED Medications  Medications   sodium chloride 0 9 % bolus 1,000 mL (0 mL Intravenous Stopped 7/16/19 0740)   tetracaine 0 5 % ophthalmic solution 1 drop (1 drop Left Eye Given 7/16/19 0748)   fluorescein sodium sterile 1 mg ophthalmic strip 1 strip (1 strip Left Eye Given 7/16/19 0748)   ondansetron (ZOFRAN) injection 4 mg (4 mg Intravenous Given 7/16/19 1158)       Diagnostic Studies  Results Reviewed     Procedure Component Value Units Date/Time    D-dimer, quantitative [[id number]]  (Normal) Collected:  07/16/19 0823    Lab Status:  Final result Specimen:  Blood from Arm, Right Updated:  07/16/19 0921     D-DIMER 0 46 mg/L     Rapid drug screen, urine [[id number]]  (Normal) Collected:  07/16/19 0810    Lab Status:  Final result Specimen:  Urine, Clean Catch Updated:  07/16/19 0857     Amph/Meth UR Negative     Barbiturate Ur Negative     Benzodiazepine Urine Negative     Cocaine Urine Negative     Methadone Urine Negative     Opiate Urine Negative     PCP Ur Negative     THC Urine Negative    Narrative:       FOR MEDICAL PURPOSES ONLY     IF CONFIRMATION NEEDED PLEASE CONTACT THE LAB WITHIN 5 DAYS  Drug Screen Cutoff Levels:  AMPHETAMINE/METHAMPHETAMINES  1000 ng/mL  BARBITURATES     200 ng/mL  BENZODIAZEPINES     200 ng/mL  COCAINE      300 ng/mL  METHADONE      300 ng/mL  OPIATES      300 ng/mL  PHENCYCLIDINE     25 ng/mL  THC       50 ng/mL      POCT pregnancy, urine [[id number]]  (Normal) Resulted:  07/16/19 0809    Lab Status:  Final result Updated:  07/16/19 0811     EXT PREG TEST UR (Ref: Negative) negative     Control valid    TSH [[id number]]  (Normal) Collected:  07/16/19 0634    Lab Status:  Final result Specimen:  Blood from Arm, Right Updated:  07/16/19 0733     TSH 3RD GENERATON 0 588 uIU/mL     Narrative:       Patients undergoing fluorescein dye angiography may retain small amounts of fluorescein in the body for 48-72 hours post procedure  Samples containing fluorescein can produce falsely depressed TSH values  If the patient had this procedure,a specimen should be resubmitted post fluorescein clearance        Troponin I [[id number]]  (Normal) Collected:  07/16/19 0633    Lab Status:  Final result Specimen:  Blood from Arm, Right Updated:  07/16/19 0714     Troponin I <0 01 ng/mL     Comprehensive metabolic panel [[id number]]  (Abnormal) Collected:  07/16/19 0634    Lab Status:  Final result Specimen:  Blood from Arm, Right Updated:  07/16/19 0703     Sodium 143 mmol/L      Potassium 3 4 mmol/L      Chloride 102 mmol/L      CO2 28 mmol/L      ANION GAP 13 mmol/L      BUN 5 mg/dL      Creatinine 0 56 mg/dL      Glucose 78 mg/dL      Calcium 8 5 mg/dL       U/L       U/L      Alkaline Phosphatase 191 U/L      Total Protein 7 2 g/dL      Albumin 3 7 g/dL      Total Bilirubin 1 80 mg/dL      eGFR 128 ml/min/1 73sq m     Narrative:       Hemolysis  National Kidney Disease Foundation guidelines for Chronic Kidney Disease (CKD):     Stage 1 with normal or high GFR (GFR > 90 mL/min/1 73 square meters)    Stage 2 Mild CKD (GFR = 60-89 mL/min/1 73 square meters)    Stage 3A Moderate CKD (GFR = 45-59 mL/min/1 73 square meters)    Stage 3B Moderate CKD (GFR = 30-44 mL/min/1 73 square meters)    Stage 4 Severe CKD (GFR = 15-29 mL/min/1 73 square meters)    Stage 5 End Stage CKD (GFR <15 mL/min/1 73 square meters)  Note: GFR calculation is accurate only with a steady state creatinine    CBC and differential [[id number]]  (Abnormal) Collected:  07/16/19 0634    Lab Status:  Final result Specimen:  Blood from Arm, Right Updated:  07/16/19 0701     WBC 5 60 Thousand/uL      RBC 3 46 Million/uL      Hemoglobin 12 3 g/dL      Hematocrit 36 4 %       fL      MCH 35 5 pg      MCHC 33 8 g/dL      RDW 15 5 %      MPV 8 9 fL      Platelets 680 Thousands/uL      Neutrophils Relative 34 %      Lymphocytes Relative 54 %      Monocytes Relative 9 %      Eosinophils Relative 1 %      Basophils Relative 3 %      Neutrophils Absolute 1 90 Thousands/µL      Lymphocytes Absolute 3 10 Thousands/µL      Monocytes Absolute 0 50 Thousand/µL      Eosinophils Absolute 0 00 Thousand/µL      Basophils Absolute 0 10 Thousands/µL     POCT alcohol breath test [[id number]]  (Normal) Resulted:  07/16/19 0634    Lab Status:  Final result Updated:  07/16/19 0634     EXTBreath Alcohol 0 289                 CT head without contrast   Final Result by Cristian Goncalves MD (07/16 0011)      No acute intracranial abnormality  Workstation performed: [id number]                    Procedures  Procedures       ED Course  ED Course as of Jul 18 2204   Tue Jul 16, 2019   0912 Procedure Note: EKG  Date/Time: 07/16/19 6:39 AM   Performed by: Gbariella Jackson  Authorized by: Gabriella Jackson  Indications / Diagnosis: Blurry vision  ECG reviewed by me, the ED Provider: yes   The EKG demonstrates:  Rhythm: normal sinus  Intervals: normal intervals  Axis: normal axis  QRS/Blocks: normal QRS  ST Changes: No acute ST Changes, no STD/ALINE      Inverted T waves in V1/V2          0642 EXTBreath Alcohol: 0 [phone] Was told by tech that patient could not see out of her left eye during visual acuity testing, immediately went to patient and asked her about this, patient states that she has been having visual loss since 5 in the morning  On my testing patient is able to see out of her left eye currently  Will repeat visual acuity  Will obtain CT head  Will hold off on stroke alert as patient currently has a nonfocal neurologic exam, NIH 0      0659 On re-evaluation patient resting comfortably, no acute complaints however her saturations drift to 88% on room air and recovered to 91% on room air, no documented history of COPD or asthma, no wheezing on exam   Patient is not tachycardic, will add D-dimer      0718 Patient saturating 95% on room air, does drift down to 88-90s when she is asleep though recovers, no complaints of shortness of breath, no tachycardia, no chest pain      0786 Ophthalmology paged      9086 Right eye pressure 16, left eye pressure 17    Visual fields tested right and left eye separately as well as together, no obvious visual field cuts  Difficult to obtain adequate history as patient is currently intoxicated, unable to complete a good endophthalmic exam , bedside ultrasound without any obvious retinal detachment, will speak to Shady and transfer for ophthalmologic evaluation      [phone] to Dr Nara Maxwell , Ophthalmology,  discussed case ,  patient will be seen at 8 am tomorrow morning at [facility]  office [address], [phone]                                  MDM  Number of Diagnoses or Management Options  Diagnosis management comments: 51-year-old female presents for evaluation of a transient blurry vision in the setting of drinking a pt of vodka this evening  Patient is a chronic alcoholic, tearful in anxious appearing on evaluation  Nonfocal neurologic exam   Alcohol level over 200 on breath alcohol test, will check lab work including TSH given patient's proptosis  Will observe and re-evaluate      Disposition  Final diagnoses:   Alcohol intoxication ([address] )   Blurry vision     Time reflects when diagnosis was documented in both MDM as applicable and the Disposition within this note     Time User Action Codes Description Comment    7/16/2019  6:50 AM Tomi Mccauley [F10 929] Alcohol intoxication ([address] )     7/16/2019  8:02 AM Tomi Mccauley [H53 8] Blurry vision       ED Disposition     ED Disposition Condition Date/Time Comment    Discharge Stable Tue Jul 16, 2019  6:50 AM Faith Mcardle discharge to home/self care  Follow-up Information     Follow up With Specialties Details Why [facility] Emergency Medicine  If symptoms worsen [address] [id number] [address] [facility] Primary Family Medicine Schedule an appointment as soon as possible for a visit   [address] [facility] [address] [place] [address]  Luis Prasad   [address] [address]      Everett Johnson MD Ophthalmology Go to  appointment at 8 am [address] [facility]            Discharge Medication List as of 7/16/2019  8:04 AM      CONTINUE these medications which have NOT CHANGED    Details   folic acid (FOLVITE) 097 mcg tablet Take 1 tablet (400 mcg total) by mouth daily for 90 days, Starting Mon 6/17/2019, Until Sun 9/15/2019, Normal      Multiple Vitamin (MULTIVITAMIN) tablet Take 1 tablet by mouth daily, Historical Med      thiamine 100 MG tablet Take 1 tablet (100 mg total) by mouth daily for 90 days, Starting Mon 6/17/2019, Until Sun 9/15/2019, Normal           No discharge procedures on file      ED Provider  Electronically Signed by           Beryl Mcardle, MD  07/18/19 5888

## 2019-07-16 NOTE — ED NOTES
Assist patient with phone; voiced understanding of need to wait on ethol level to lower or have family/friend come   Ambulating in room with steady gait       Frantz Stallings RN  07/16/19 3285

## 2019-07-16 NOTE — DISCHARGE INSTRUCTIONS
Please follow-up with the primary care provider for further care, if symptoms worsen please return to  the emergency department     you have an appointment with Ophthalmology,  Baron Cottrell  at 08:00 on 7/17,  please go to that appointment, if symptoms worsen prior to the appointment please return to the emergency department